# Patient Record
Sex: MALE | Race: BLACK OR AFRICAN AMERICAN | NOT HISPANIC OR LATINO | URBAN - METROPOLITAN AREA
[De-identification: names, ages, dates, MRNs, and addresses within clinical notes are randomized per-mention and may not be internally consistent; named-entity substitution may affect disease eponyms.]

---

## 2022-05-30 PROBLEM — D75.1 POLYCYTHEMIA: Status: ACTIVE | Noted: 2022-05-30

## 2022-05-30 NOTE — PROGRESS NOTES
Subjective:       Patient ID: Wei Carbone is a 81 y.o. male.    Chief Complaint: polycythemia      History of Present Illness  Chief complaint: polycythemia    HPI: 82 y/o M w/ PMHx of HLD, PAD, COPD, esophageal stricture referred to [facility] for polycythemia    Review of his labs reveals stable polycythemia dating back to 2015    His daughter who was present during initial interview, reported polycythemia in patient's mother, in her self and another sister (so 2 of his children) as well as maternal uncle.    Most recent phlebotomy 2/10/22 300ml.    Interval History:  Today 5/31/22: Patient here today for follow up visit. He feels well overall. Denies fevers, chills, CP, n/v/c/d, melena, hematochezia, hematuria, abd pain, fatigue, weight loss, weakness. He reports some DUARTE, which is unchanged from prior. No other complaints or concerns reported.    PMHx: HLD, PAD, COPD, esophageal stricture  PSHx: tonsillectomy, eye surgery, carpal tunnel, esophageal dilatation, atherectomy and PTA of right and left SFA, PTA of left common iliac artery  Social Hx: current every day smoker since 1961, 1 ppd  Family Hx: daughter: breast cancer, sister: unknown cancer, grandson: yolk sac, multiple family members including mother, 2 daughters and maternal uncle with polycythemia  Meds: reviewed  Allergies: atorvastatin, penicillin, pravastatin    Labs:  5/24/22 WBC 6.58, Hgb 17.6, Hct 53.7, , retic 0.88%  3/7/22 WBC 7.1 RBC 5.3 Hg 16.8 HCT 51   1/10/22 WBC 6.72, Hgb 18.4, Hct 55.8,   10/25/21 WBC 6.44 Hg 18.9 HCT 57  Phlebotomy of 500ml  6/28/21 WBC 5.90, hgb 17.5, hct 53.6, RBC 5.69, , MCV  4/27/21  Ferritin 33 WBC 7.07 RBC 5.44 Hg 16.9 HCT 51.2 MCV 94.1  ANC 4.13  1/27/21 Hgb 18.3, Hct 56.9, ferritin 38  11/10/20 Hg 17.1 HCT 52.2  10/6/20 Hg 17.3 HCT 53  9/10/20 Hgb 16.9, Hct 52.6,  remainder of CBC WNL  5/7/20 JAK2 V617F detected but below limit of quantitation (limit of  quantitation 0.5%), MPL neg, CALR neg  5/7/20 EPO receptor mutation analysis not detected in exon 7 or 8. VHL sequencing negative for mutation. EPO level 6. Testosterone 267, Cr 0.85  WBC 6.72 Hg 19.3 RBC 5.89 MCV 99 HCT 58.3   4/30/20 Cr 0.93 WBC 8.51 Hg 18.9 HCT 57.3 MCV 97.4 RDW 11.9  ANC 5.14 AEC 0.15 ABC 0.1 IG 0.03  3/5/20 Cr 0.9 Alb 3.9 TP 6.7 WBC 6.9 RBC 5.64 Hg 18.2 HCT 54.2 MCV 96.7 RDW 13   11/28/19 Hg 18.7 HCT 56.3  8/13/15 Hg 18.3 HCT 55.7    Imaging:  3/24/22 CT Chest: a few tiny (les than 5 mm) stable pulmonary nodules. There has been no adverse interval change since prior study.  10/2/20 CT chest w/o contrast: tiny b/l calcified and non calcified pulmonary nodules largest 2-3mm. Most likely benign, 12 month follow up recommended. Atherosclerosis and DJD. Ectasia of proximal descending thoracic aorta 3.4cm. Minimal b/l gynecomastia. Minimal bronchiectasis in RUL and RML  8/20/19 CXR: heart size normal, lungs are clear b/l. Pulm vasculature is normal    Path: none       Review of Systems  CONSTITUTIONAL: no fevers, no chills, no weight loss, no fatigue, no weakness  HEMATOLOGIC: no abnormal bleeding, no abnormal bruising, no drenching night sweats  ONCOLOGIC: no new masses or lumps  HEENT: no vision loss, no tinnitus or hearing loss, no nose bleeding, no dysphagia, no odynophagia  CVS: no chest pain, no palpitations, + dyspnea on exertion (unchanged)  RESP: no shortness of breath, no hemoptysis, no cough  GI: no nausea, no vomiting, no diarrhea, no constipation, no melena, no hematochezia, no hematemesis, no abdominal pain, no increase in abdominal girth  : no dysuria, no hematuria, no hesitancy, no scrotal swelling, no discharge  INTEGUMENT: no rashes, no abnormal bruising, no nail pitting, no hyperpigmentation  NEURO: no falls, no memory loss, no paresthesias or dysesthesias, no urofecal incontinence or retention, no loss of strength on any extremity  MSK: no back pain,  no new joint pain, no joint swelling  PSYCH: no suicidal or homicidal ideation, no depression, no insomnia, no anhedonia  ENDOCRINE: no heat or cold intolerance, no polyuria, no polydipsia         Objective:      Physical Exam  Vitals:    05/31/22 1002   BP: (!) 167/77   Pulse: 67   Resp: 18   Temp: 97.9 °F (36.6 °C)       ECOG PS 1  GA: AAOx3, NAD  HEENT: NCAT, PERRLA, EOMI, fair dentition, no oral ulcers  LYMPH: no cervical, axillary or supraclavicular adenopathy  CVS: s1s2 RRR, no M/R/G  RESP: CTA b/l but diminished, no crackles, no wheezes or rhonchi  ABD: soft, NT, ND, BS+, no hepatosplenomegaly  EXT: no deformities, no pedal edema  SKIN: no rashes, no bruises or purpura, warm and dry  NEURO: normal mentation, strength 5/5 on all 4 extremities, no sensory deficits     Assessment:       Problem List Items Addressed This Visit        Oncology    Polycythemia - Primary      Other Visit Diagnoses     Congenital polycythemia        Tobacco user                Plan:     1. Polycythemia D75.1   Etiology unclear but he is a smoker, has known COPD so chronic hypoxic state is likely. However some minimal increase in basophils noted which raises suspicion of MPN. JAK2 V617F detected but at very low levels, clinical significance uncertain  EPO level low normal. We discussed bone marrow biopsy, but wants to hold off now  Sleep study was discussed he previously but he still wishes to hold off. He may consider home study in the future. Will discuss again at his return visit.  For secondary polycythemia no strict Hg/HCT goals but ideally target Hg <18.5 g/dl. He felt a bit weak for 2 days after recent phlebotomy, volume reduced to 300ml instead of 500ml  Most recent phlebotomy on 2/10/22, 300 ml  For PV, target HCT<45. He is already on dual antiplatelet agents  Will keep blood work [id number] for now, next due 7/5/22  RTC in 12 weeks with CBC and retic  Call if any questions or concerns     2. 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 VHL sequencing of entire gene was negative. EPOR exon 7 and 8 negative  This does not exclude familial polycythemia. We may need to test BPGM, HIF2/EPAS1, methemoglobin or we may need full sequencing of EPOR and JAK2 genes for some cryptic mutations      3. Tobacco use Z72.0   Lung cancer screening. CT chest non contrast 10/2020 with 2-3mm b/l pulm nodules. Repeat obtained 3/2022 which showed stability.   He could not tolerate either Chantix, nicotine lozenge or nicotine patches. He is now not interested in quitting, he is smoking 1 ppd           SAIRA Hay

## 2022-05-31 ENCOUNTER — OFFICE VISIT (OUTPATIENT)
Dept: HEMATOLOGY/ONCOLOGY | Facility: CLINIC | Age: 82
End: 2022-05-31
Payer: COMMERCIAL

## 2022-05-31 VITALS
SYSTOLIC BLOOD PRESSURE: 167 MMHG | RESPIRATION RATE: 18 BRPM | HEART RATE: 67 BPM | DIASTOLIC BLOOD PRESSURE: 77 MMHG | WEIGHT: 163.81 LBS | TEMPERATURE: 98 F | BODY MASS INDEX: 26.33 KG/M2 | OXYGEN SATURATION: 96 % | HEIGHT: 66 IN

## 2022-05-31 DIAGNOSIS — Z72.0 TOBACCO USER: ICD-10-CM

## 2022-05-31 DIAGNOSIS — D75.1 POLYCYTHEMIA: Primary | ICD-10-CM

## 2022-05-31 DIAGNOSIS — D75.0 CONGENITAL POLYCYTHEMIA: ICD-10-CM

## 2022-05-31 PROCEDURE — 99213 OFFICE O/P EST LOW 20 MIN: CPT | Mod: ,,, | Performed by: NURSE PRACTITIONER

## 2022-05-31 PROCEDURE — 1126F PR PAIN SEVERITY QUANTIFIED, NO PAIN PRESENT: ICD-10-PCS | Mod: CPTII,,, | Performed by: NURSE PRACTITIONER

## 2022-05-31 PROCEDURE — 1101F PT FALLS ASSESS-DOCD LE1/YR: CPT | Mod: CPTII,,, | Performed by: NURSE PRACTITIONER

## 2022-05-31 PROCEDURE — 1101F PR PT FALLS ASSESS DOC 0-1 FALLS W/OUT INJ PAST YR: ICD-10-PCS | Mod: CPTII,,, | Performed by: NURSE PRACTITIONER

## 2022-05-31 PROCEDURE — 1160F PR REVIEW ALL MEDS BY PRESCRIBER/CLIN PHARMACIST DOCUMENTED: ICD-10-PCS | Mod: CPTII,,, | Performed by: NURSE PRACTITIONER

## 2022-05-31 PROCEDURE — 99213 PR OFFICE/OUTPT VISIT, EST, LEVL III, 20-29 MIN: ICD-10-PCS | Mod: ,,, | Performed by: NURSE PRACTITIONER

## 2022-05-31 PROCEDURE — 1160F RVW MEDS BY RX/DR IN RCRD: CPT | Mod: CPTII,,, | Performed by: NURSE PRACTITIONER

## 2022-05-31 PROCEDURE — 1159F MED LIST DOCD IN RCRD: CPT | Mod: CPTII,,, | Performed by: NURSE PRACTITIONER

## 2022-05-31 PROCEDURE — 1126F AMNT PAIN NOTED NONE PRSNT: CPT | Mod: CPTII,,, | Performed by: NURSE PRACTITIONER

## 2022-05-31 PROCEDURE — 3077F PR MOST RECENT SYSTOLIC BLOOD PRESSURE >= 140 MM HG: ICD-10-PCS | Mod: CPTII,,, | Performed by: NURSE PRACTITIONER

## 2022-05-31 PROCEDURE — 1159F PR MEDICATION LIST DOCUMENTED IN MEDICAL RECORD: ICD-10-PCS | Mod: CPTII,,, | Performed by: NURSE PRACTITIONER

## 2022-05-31 PROCEDURE — 3078F DIAST BP <80 MM HG: CPT | Mod: CPTII,,, | Performed by: NURSE PRACTITIONER

## 2022-05-31 PROCEDURE — 3288F FALL RISK ASSESSMENT DOCD: CPT | Mod: CPTII,,, | Performed by: NURSE PRACTITIONER

## 2022-05-31 PROCEDURE — 3288F PR FALLS RISK ASSESSMENT DOCUMENTED: ICD-10-PCS | Mod: CPTII,,, | Performed by: NURSE PRACTITIONER

## 2022-05-31 PROCEDURE — 3078F PR MOST RECENT DIASTOLIC BLOOD PRESSURE < 80 MM HG: ICD-10-PCS | Mod: CPTII,,, | Performed by: NURSE PRACTITIONER

## 2022-05-31 PROCEDURE — 3077F SYST BP >= 140 MM HG: CPT | Mod: CPTII,,, | Performed by: NURSE PRACTITIONER

## 2022-05-31 RX ORDER — FLUTICASONE PROPIONATE AND SALMETEROL 100; 50 UG/1; UG/1
1 POWDER RESPIRATORY (INHALATION) 2 TIMES DAILY
COMMUNITY

## 2022-05-31 RX ORDER — CLOPIDOGREL BISULFATE 75 MG/1
75 TABLET ORAL DAILY
COMMUNITY

## 2022-05-31 RX ORDER — ALBUTEROL SULFATE 0.83 MG/ML
2.5 SOLUTION RESPIRATORY (INHALATION) EVERY 6 HOURS PRN
COMMUNITY

## 2022-08-29 NOTE — PROGRESS NOTES
Subjective:       Patient ID: Wei Carbone is a 81 y.o. male.    Chief Complaint: Polycthemia (12 wk f/up with labs. No complaints)      History of Present Illness  Chief complaint: polycythemia    HPI: 82 y/o M w/ PMHx of HLD, PAD, COPD, esophageal stricture referred to [facility] for polycythemia    Review of his labs reveals stable polycythemia dating back to 2015    His daughter who was present during initial interview, reported polycythemia in patient's mother, in her self and another sister (so 2 of his children) as well as maternal uncle.    Most recent phlebotomy 2/10/22 300ml.    Interval History:  Today 8/30/22: Patient here today for follow up visit. He feels well overall. Denies fevers, chills, CP, n/v/c/d, melena, hematochezia, hematuria, abd pain, fatigue, weight loss, or weakness. He reports some DUARTE, which is unchanged from prior. No other complaints or concerns reported.    PMHx: HLD, PAD, COPD, esophageal stricture  PSHx: tonsillectomy, eye surgery, carpal tunnel, esophageal dilatation, atherectomy and PTA of right and left SFA, PTA of left common iliac artery  Social Hx: current every day smoker since 1961, 1 ppd  Family Hx: daughter: breast cancer, sister: unknown cancer, grandson: yolk sac, multiple family members including mother, 2 daughters and maternal uncle with polycythemia  Meds: reviewed  Allergies: atorvastatin, penicillin, pravastatin    Labs:  8/22/22 WBC 6.45, Hgb 17.6, Hct 53.6, , ANC 3.71, Retic 1.25%  5/24/22 WBC 6.58, Hgb 17.6, Hct 53.7, , retic 0.88%  3/7/22 WBC 7.1 RBC 5.3 Hg 16.8 HCT 51   1/10/22 WBC 6.72, Hgb 18.4, Hct 55.8,   10/25/21 WBC 6.44 Hg 18.9 HCT 57  Phlebotomy of 500ml  6/28/21 WBC 5.90, hgb 17.5, hct 53.6, RBC 5.69, , MCV  4/27/21  Ferritin 33 WBC 7.07 RBC 5.44 Hg 16.9 HCT 51.2 MCV 94.1  ANC 4.13  1/27/21 Hgb 18.3, Hct 56.9, ferritin 38  11/10/20 Hg 17.1 HCT 52.2  10/6/20 Hg 17.3 HCT 53  9/10/20 Hgb 16.9, Hct  52.6,  remainder of CBC WNL  5/7/20 JAK2 V617F detected but below limit of quantitation (limit of quantitation 0.5%), MPL neg, CALR neg  5/7/20 EPO receptor mutation analysis not detected in exon 7 or 8. VHL sequencing negative for mutation. EPO level 6. Testosterone 267, Cr 0.85  WBC 6.72 Hg 19.3 RBC 5.89 MCV 99 HCT 58.3   4/30/20 Cr 0.93 WBC 8.51 Hg 18.9 HCT 57.3 MCV 97.4 RDW 11.9  ANC 5.14 AEC 0.15 ABC 0.1 IG 0.03  3/5/20 Cr 0.9 Alb 3.9 TP 6.7 WBC 6.9 RBC 5.64 Hg 18.2 HCT 54.2 MCV 96.7 RDW 13   11/28/19 Hg 18.7 HCT 56.3  8/13/15 Hg 18.3 HCT 55.7    Imaging:  3/24/22 CT Chest: a few tiny (les than 5 mm) stable pulmonary nodules. There has been no adverse interval change since prior study.  10/2/20 CT chest w/o contrast: tiny b/l calcified and non calcified pulmonary nodules largest 2-3mm. Most likely benign, 12 month follow up recommended. Atherosclerosis and DJD. Ectasia of proximal descending thoracic aorta 3.4cm. Minimal b/l gynecomastia. Minimal bronchiectasis in RUL and RML  8/20/19 CXR: heart size normal, lungs are clear b/l. Pulm vasculature is normal    Path: none       Review of Systems  CONSTITUTIONAL: no fevers, no chills, no weight loss, no fatigue, no weakness  HEMATOLOGIC: no abnormal bleeding, no abnormal bruising, no drenching night sweats  ONCOLOGIC: no new masses or lumps  HEENT: no vision loss, no tinnitus or hearing loss, no nose bleeding, no dysphagia, no odynophagia  CVS: no chest pain, no palpitations, + dyspnea on exertion (unchanged)  RESP: no shortness of breath, no hemoptysis, no cough  GI: no nausea, no vomiting, no diarrhea, no constipation, no melena, no hematochezia, no hematemesis, no abdominal pain, no increase in abdominal girth  : no dysuria, no hematuria, no hesitancy, no scrotal swelling, no discharge  INTEGUMENT: no rashes, no abnormal bruising, no nail pitting, no hyperpigmentation  NEURO: no falls, no memory loss, no paresthesias or  dysesthesias, no urofecal incontinence or retention, no loss of strength on any extremity  MSK: no back pain, no new joint pain, no joint swelling  PSYCH: no suicidal or homicidal ideation, no depression, no insomnia, no anhedonia  ENDOCRINE: no heat or cold intolerance, no polyuria, no polydipsia         Objective:      Physical Exam  Vitals:    08/30/22 0938   BP: (!) 164/70   Pulse: 75   Resp: 18   Temp: 97.5 °F (36.4 °C)         ECOG PS 1  GA: AAOx3, NAD  HEENT: NCAT, PERRLA, EOMI, fair dentition, no oral ulcers  LYMPH: no cervical, axillary or supraclavicular adenopathy  CVS: s1s2 RRR, no M/R/G  RESP: CTA b/l but diminished, no crackles, no wheezes or rhonchi  ABD: soft, NT, ND, BS+, no hepatosplenomegaly  EXT: no deformities, no pedal edema  SKIN: no rashes, no bruises or purpura, warm and dry  NEURO: normal mentation, strength 5/5 on all 4 extremities, no sensory deficits     Assessment:       Problem List Items Addressed This Visit          Oncology    Polycythemia - Primary         Plan:     1. Polycythemia D75.1   Etiology unclear but he is a smoker, has known COPD so chronic hypoxic state is likely. However some minimal increase in basophils noted which raises suspicion of MPN. JAK2 V617F detected but at very low levels, clinical significance uncertain  EPO level low normal. We discussed bone marrow biopsy, but wants to hold off now  Sleep study was discussed he previously but he still wishes to hold off. He may consider home study in the future. Will discuss again at his return visit.  For secondary polycythemia no strict Hg/HCT goals but ideally target Hg <18.5 g/dl. He felt a bit weak for 2 days after recent phlebotomy, volume reduced to 300ml instead of 500ml  Most recent phlebotomy on 2/10/22, 300 ml  For PV, target HCT<45. He is already on dual antiplatelet agents  Will keep blood work [id number] for now, next due 10/11/22  RTC in 12 weeks with CBC and retic  Call if any questions or concerns     2.  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 VHL sequencing of entire gene was negative. EPOR exon 7 and 8 negative  This does not exclude familial polycythemia. We may need to test BPGM, HIF2/EPAS1, methemoglobin or we may need full sequencing of EPOR and JAK2 genes for some cryptic mutations      3. Tobacco use Z72.0   Lung cancer screening. CT chest non contrast 10/2020 with 2-3mm b/l pulm nodules. Repeat obtained 3/2022 which showed stability.   He could not tolerate either Chantix, nicotine lozenge or nicotine patches. He is now not interested in quitting, he is smoking 1 ppd but states he has been trying to cut back           SAIRA Hay

## 2022-08-30 ENCOUNTER — OFFICE VISIT (OUTPATIENT)
Dept: HEMATOLOGY/ONCOLOGY | Facility: CLINIC | Age: 82
End: 2022-08-30
Payer: COMMERCIAL

## 2022-08-30 VITALS
HEIGHT: 66 IN | HEART RATE: 75 BPM | TEMPERATURE: 98 F | OXYGEN SATURATION: 95 % | SYSTOLIC BLOOD PRESSURE: 164 MMHG | WEIGHT: 159.81 LBS | BODY MASS INDEX: 25.68 KG/M2 | RESPIRATION RATE: 18 BRPM | DIASTOLIC BLOOD PRESSURE: 70 MMHG

## 2022-08-30 DIAGNOSIS — D75.1 POLYCYTHEMIA: Primary | ICD-10-CM

## 2022-08-30 PROCEDURE — 1159F PR MEDICATION LIST DOCUMENTED IN MEDICAL RECORD: ICD-10-PCS | Mod: CPTII,,, | Performed by: NURSE PRACTITIONER

## 2022-08-30 PROCEDURE — 3078F PR MOST RECENT DIASTOLIC BLOOD PRESSURE < 80 MM HG: ICD-10-PCS | Mod: CPTII,,, | Performed by: NURSE PRACTITIONER

## 2022-08-30 PROCEDURE — 3288F FALL RISK ASSESSMENT DOCD: CPT | Mod: CPTII,,, | Performed by: NURSE PRACTITIONER

## 2022-08-30 PROCEDURE — 1101F PT FALLS ASSESS-DOCD LE1/YR: CPT | Mod: CPTII,,, | Performed by: NURSE PRACTITIONER

## 2022-08-30 PROCEDURE — 3077F PR MOST RECENT SYSTOLIC BLOOD PRESSURE >= 140 MM HG: ICD-10-PCS | Mod: CPTII,,, | Performed by: NURSE PRACTITIONER

## 2022-08-30 PROCEDURE — 3077F SYST BP >= 140 MM HG: CPT | Mod: CPTII,,, | Performed by: NURSE PRACTITIONER

## 2022-08-30 PROCEDURE — 99213 PR OFFICE/OUTPT VISIT, EST, LEVL III, 20-29 MIN: ICD-10-PCS | Mod: ,,, | Performed by: NURSE PRACTITIONER

## 2022-08-30 PROCEDURE — 1126F AMNT PAIN NOTED NONE PRSNT: CPT | Mod: CPTII,,, | Performed by: NURSE PRACTITIONER

## 2022-08-30 PROCEDURE — 1126F PR PAIN SEVERITY QUANTIFIED, NO PAIN PRESENT: ICD-10-PCS | Mod: CPTII,,, | Performed by: NURSE PRACTITIONER

## 2022-08-30 PROCEDURE — 1101F PR PT FALLS ASSESS DOC 0-1 FALLS W/OUT INJ PAST YR: ICD-10-PCS | Mod: CPTII,,, | Performed by: NURSE PRACTITIONER

## 2022-08-30 PROCEDURE — 3078F DIAST BP <80 MM HG: CPT | Mod: CPTII,,, | Performed by: NURSE PRACTITIONER

## 2022-08-30 PROCEDURE — 3288F PR FALLS RISK ASSESSMENT DOCUMENTED: ICD-10-PCS | Mod: CPTII,,, | Performed by: NURSE PRACTITIONER

## 2022-08-30 PROCEDURE — 1159F MED LIST DOCD IN RCRD: CPT | Mod: CPTII,,, | Performed by: NURSE PRACTITIONER

## 2022-08-30 PROCEDURE — 99213 OFFICE O/P EST LOW 20 MIN: CPT | Mod: ,,, | Performed by: NURSE PRACTITIONER

## 2022-11-18 ENCOUNTER — TELEPHONE (OUTPATIENT)
Dept: HEMATOLOGY/ONCOLOGY | Facility: CLINIC | Age: 82
End: 2022-11-18
Payer: COMMERCIAL

## 2022-11-18 NOTE — TELEPHONE ENCOUNTER
Informed by the labs of having abdominal hemoglobin of 18.5 and hematocrit of 54.8.      Called the patient on available phone numbers but phone not answered.     Patient is scheduled for appointment next week.      Jasmina Pittman MD  Hematology / Oncology

## 2022-11-22 ENCOUNTER — OFFICE VISIT (OUTPATIENT)
Dept: HEMATOLOGY/ONCOLOGY | Facility: CLINIC | Age: 82
End: 2022-11-22
Payer: MEDICARE

## 2022-11-22 ENCOUNTER — TELEPHONE (OUTPATIENT)
Dept: HEMATOLOGY/ONCOLOGY | Facility: CLINIC | Age: 82
End: 2022-11-22

## 2022-11-22 VITALS
TEMPERATURE: 99 F | HEIGHT: 66 IN | WEIGHT: 160.63 LBS | SYSTOLIC BLOOD PRESSURE: 154 MMHG | HEART RATE: 68 BPM | RESPIRATION RATE: 18 BRPM | DIASTOLIC BLOOD PRESSURE: 78 MMHG | BODY MASS INDEX: 25.81 KG/M2 | OXYGEN SATURATION: 95 %

## 2022-11-22 DIAGNOSIS — Z72.0 TOBACCO USER: ICD-10-CM

## 2022-11-22 DIAGNOSIS — D75.1 POLYCYTHEMIA: Primary | ICD-10-CM

## 2022-11-22 DIAGNOSIS — D75.0 CONGENITAL POLYCYTHEMIA: ICD-10-CM

## 2022-11-22 PROCEDURE — 1160F PR REVIEW ALL MEDS BY PRESCRIBER/CLIN PHARMACIST DOCUMENTED: ICD-10-PCS | Mod: CPTII,,, | Performed by: NURSE PRACTITIONER

## 2022-11-22 PROCEDURE — 1126F AMNT PAIN NOTED NONE PRSNT: CPT | Mod: CPTII,,, | Performed by: NURSE PRACTITIONER

## 2022-11-22 PROCEDURE — 3077F PR MOST RECENT SYSTOLIC BLOOD PRESSURE >= 140 MM HG: ICD-10-PCS | Mod: CPTII,,, | Performed by: NURSE PRACTITIONER

## 2022-11-22 PROCEDURE — 99214 PR OFFICE/OUTPT VISIT, EST, LEVL IV, 30-39 MIN: ICD-10-PCS | Mod: ,,, | Performed by: NURSE PRACTITIONER

## 2022-11-22 PROCEDURE — 3077F SYST BP >= 140 MM HG: CPT | Mod: CPTII,,, | Performed by: NURSE PRACTITIONER

## 2022-11-22 PROCEDURE — 99214 OFFICE O/P EST MOD 30 MIN: CPT | Mod: ,,, | Performed by: NURSE PRACTITIONER

## 2022-11-22 PROCEDURE — 3288F FALL RISK ASSESSMENT DOCD: CPT | Mod: CPTII,,, | Performed by: NURSE PRACTITIONER

## 2022-11-22 PROCEDURE — 1101F PT FALLS ASSESS-DOCD LE1/YR: CPT | Mod: CPTII,,, | Performed by: NURSE PRACTITIONER

## 2022-11-22 PROCEDURE — 1160F RVW MEDS BY RX/DR IN RCRD: CPT | Mod: CPTII,,, | Performed by: NURSE PRACTITIONER

## 2022-11-22 PROCEDURE — 1101F PR PT FALLS ASSESS DOC 0-1 FALLS W/OUT INJ PAST YR: ICD-10-PCS | Mod: CPTII,,, | Performed by: NURSE PRACTITIONER

## 2022-11-22 PROCEDURE — 3078F PR MOST RECENT DIASTOLIC BLOOD PRESSURE < 80 MM HG: ICD-10-PCS | Mod: CPTII,,, | Performed by: NURSE PRACTITIONER

## 2022-11-22 PROCEDURE — 3288F PR FALLS RISK ASSESSMENT DOCUMENTED: ICD-10-PCS | Mod: CPTII,,, | Performed by: NURSE PRACTITIONER

## 2022-11-22 PROCEDURE — 1126F PR PAIN SEVERITY QUANTIFIED, NO PAIN PRESENT: ICD-10-PCS | Mod: CPTII,,, | Performed by: NURSE PRACTITIONER

## 2022-11-22 PROCEDURE — 3078F DIAST BP <80 MM HG: CPT | Mod: CPTII,,, | Performed by: NURSE PRACTITIONER

## 2022-11-22 PROCEDURE — 1159F PR MEDICATION LIST DOCUMENTED IN MEDICAL RECORD: ICD-10-PCS | Mod: CPTII,,, | Performed by: NURSE PRACTITIONER

## 2022-11-22 PROCEDURE — 1159F MED LIST DOCD IN RCRD: CPT | Mod: CPTII,,, | Performed by: NURSE PRACTITIONER

## 2022-11-22 NOTE — PROGRESS NOTES
Subjective:       Patient ID: Wei Carbone is a 82 y.o. male.    Chief Complaint: No chief complaint on file.      History of Present Illness  Chief complaint: polycythemia    HPI: 80 y/o M w/ PMHx of HLD, PAD, COPD, esophageal stricture referred to [facility] for polycythemia    Review of his labs reveals stable polycythemia dating back to 2015    His daughter who was present during initial interview, reported polycythemia in patient's mother, in her self and another sister (so 2 of his children) as well as maternal uncle.    phlebotomy history: 2/10/22 300ml.    Interval History:  11/22/2022:   Patient presents to clinic today for a 3 month follow up visit.   Feeling sad. States he lost a grand daughter yesterday(MI secconary to illicit drug use). Otherwise states has been doing well, and has not had a phlebotomy in 6 months.   Had some abdominal bloating last week,none since. Has occurred x 2 in last 6 months. No abdominal pain. Takes a over the counter medication(Acdil). Treats symptoms well.   He denies any satiety. He denies any itching, numbness, tingling, burning, or weakness of hands, feet, arms or legs.   No unusual bleeding, painful swelling of joints. No shortness of breath or difficulty breathing when lying down.   No fevers, chills, CP, n/v/c/d, fatigue, weight loss, or weakness.  He reports some SOB with exertion. States has had for many years, which is unchanged from prior. Attributes to having Emphysema.  Continues to smoke and states does not want to quit.     PMHx: HLD, PAD, COPD, esophageal stricture  PSHx: tonsillectomy, eye surgery, carpal tunnel, esophageal dilatation, atherectomy and PTA of right and left SFA, PTA of left common iliac artery  Social Hx: current every day smoker since 1961, 1 ppd  Family Hx: daughter: breast cancer, sister: unknown cancer, grandson: yolk sac, multiple family members including mother, 2 daughters and maternal uncle with polycythemia  Meds: reviewed  Allergies:  atorvastatin, penicillin, pravastatin    Labs:  11/17/2022:  WBC 7.19, hemoglobin 18.0, hematocrit 35.9, MCV 98.1, , retic 1.18, ANC 4.76  8/22/22 WBC 6.45, Hgb 17.6, Hct 53.6, , ANC 3.71, Retic 1.25%  5/24/22 WBC 6.58, Hgb 17.6, Hct 53.7, , retic 0.88%  3/7/22 WBC 7.1 RBC 5.3 Hg 16.8 HCT 51   1/10/22 WBC 6.72, Hgb 18.4, Hct 55.8,   10/25/21 WBC 6.44 Hg 18.9 HCT 57  Phlebotomy of 500ml  6/28/21 WBC 5.90, hgb 17.5, hct 53.6, RBC 5.69, , MCV  4/27/21  Ferritin 33 WBC 7.07 RBC 5.44 Hg 16.9 HCT 51.2 MCV 94.1  ANC 4.13  1/27/21 Hgb 18.3, Hct 56.9, ferritin 38  11/10/20 Hg 17.1 HCT 52.2  10/6/20 Hg 17.3 HCT 53  9/10/20 Hgb 16.9, Hct 52.6,  remainder of CBC WNL  5/7/20 JAK2 V617F detected but below limit of quantitation (limit of quantitation 0.5%), MPL neg, CALR neg  5/7/20 EPO receptor mutation analysis not detected in exon 7 or 8. VHL sequencing negative for mutation. EPO level 6. Testosterone 267, Cr 0.85  WBC 6.72 Hg 19.3 RBC 5.89 MCV 99 HCT 58.3   4/30/20 Cr 0.93 WBC 8.51 Hg 18.9 HCT 57.3 MCV 97.4 RDW 11.9  ANC 5.14 AEC 0.15 ABC 0.1 IG 0.03  3/5/20 Cr 0.9 Alb 3.9 TP 6.7 WBC 6.9 RBC 5.64 Hg 18.2 HCT 54.2 MCV 96.7 RDW 13   11/28/19 Hg 18.7 HCT 56.3  8/13/15 Hg 18.3 HCT 55.7    Imaging:  3/24/22 CT Chest: a few tiny (les than 5 mm) stable pulmonary nodules. There has been no adverse interval change since prior study.  10/2/20 CT chest w/o contrast: tiny b/l calcified and non calcified pulmonary nodules largest 2-3mm. Most likely benign, 12 month follow up recommended. Atherosclerosis and DJD. Ectasia of proximal descending thoracic aorta 3.4cm. Minimal b/l gynecomastia. Minimal bronchiectasis in RUL and RML  8/20/19 CXR: heart size normal, lungs are clear b/l. Pulm vasculature is normal    Path: none     Review of Systems  CONSTITUTIONAL: no fevers, no chills, no weight loss, no fatigue, no weakness  HEMATOLOGIC: no  "abnormal bleeding, no abnormal bruising, no drenching night sweats  ONCOLOGIC: no new masses or lumps  HEENT: no vision loss, no tinnitus or hearing loss, no nose bleeding, no dysphagia, no odynophagia  CVS: no chest pain, no palpitations, + dyspnea on exertion (unchanged)  RESP: no shortness of breath, no hemoptysis, no cough  GI: no nausea, no vomiting, no diarrhea, no constipation, no melena, no hematochezia, no hematemesis, no abdominal pain, no increase in abdominal girth  : no dysuria, no hematuria, no hesitancy, no scrotal swelling, no discharge  INTEGUMENT: no rashes, no abnormal bruising, no nail pitting, no hyperpigmentation  NEURO: no falls, no memory loss, no paresthesias or dysesthesias, no urofecal incontinence or retention, no loss of strength on any extremity  MSK: no back pain, no new joint pain, no joint swelling  PSYCH: no suicidal or homicidal ideation, no depression, no insomnia, no anhedonia  ENDOCRINE: no heat or cold intolerance, no polyuria, no polydipsia     Objective:     Physical Exam  Blood pressure (!) 154/78, pulse 68, temperature 98.7 °F (37.1 °C), temperature source Oral, resp. rate 18, height 5' 6" (1.676 m), weight 72.8 kg (160 lb 9.6 oz), SpO2 95 %.  ECOG PS 1  GA: AAOx3, NAD  HEENT: NCAT, PERRLA, EOMI, fair dentition, no oral ulcers  LYMPH: no cervical, axillary or supraclavicular adenopathy  CVS: s1s2 RRR, no M/R/G  RESP: CTA b/l but diminished bilaterally, no crackles, no wheezes or rhonchi. No labored breathing.   ABD: soft, NT, ND, BS+, no hepatosplenomegaly  EXT: no deformities, no pedal edema  SKIN: no rashes, no bruises or purpura, warm and dry  NEURO: normal mentation, strength 5/5 on all 4 extremities, no sensory deficits     Assessment/Plan:     1. Polycythemia D75.1   Etiology unclear but he is a smoker, has known COPD so chronic hypoxic state is likely. However some minimal increase in basophils noted which raises suspicion of MPN. JAK2 V617F detected but at very " low levels, clinical significance uncertain  EPO level low normal. We discussed bone marrow biopsy, but wants to hold off now  Sleep study was discussed he previously but he still wishes to hold off. He may consider home study in the future. Will discuss again at his return visit.  For secondary polycythemia no strict Hg/HCT goals but ideally target Hg <18.5 g/dl. He felt a bit weak for 2 days after recent phlebotomy, volume reduced to 300ml instead of 500ml  Most recent phlebotomy on 2/10/22, 300 ml  For PV, target HCT<45.   He is now only on Plavix.  HCT 55.9 today. Will set him up for phlebotomy today. 300 mLs.   Will keep blood work q 6 weeks for now, CBC  due 1/3/2023.   RTC in 12 weeks with CBC and retic  Call if any questions or concerns     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 VHL sequencing of entire gene was negative. EPOR exon 7 and 8 negative  This does not exclude familial polycythemia. We may need to test BPGM, HIF2/EPAS1, methemoglobin or we may need full sequencing of EPOR and JAK2 genes for some cryptic mutations      3. Tobacco use Z72.0   Lung cancer screening. CT chest non contrast 10/2020 with 2-3mm b/l pulm nodules. Repeat obtained 3/2022 which showed stability.   He could not tolerate either Chantix, nicotine lozenge or nicotine patches. He is now not interested in quitting at presemt/       WILLIAM Akers

## 2023-02-07 ENCOUNTER — TELEPHONE (OUTPATIENT)
Dept: HEMATOLOGY/ONCOLOGY | Facility: CLINIC | Age: 83
End: 2023-02-07
Payer: MEDICARE

## 2023-02-14 ENCOUNTER — OFFICE VISIT (OUTPATIENT)
Dept: HEMATOLOGY/ONCOLOGY | Facility: CLINIC | Age: 83
End: 2023-02-14
Payer: MEDICARE

## 2023-02-14 VITALS
WEIGHT: 151 LBS | SYSTOLIC BLOOD PRESSURE: 173 MMHG | BODY MASS INDEX: 24.27 KG/M2 | TEMPERATURE: 98 F | OXYGEN SATURATION: 95 % | DIASTOLIC BLOOD PRESSURE: 81 MMHG | HEIGHT: 66 IN | RESPIRATION RATE: 18 BRPM | HEART RATE: 78 BPM

## 2023-02-14 DIAGNOSIS — D75.1 POLYCYTHEMIA: Primary | ICD-10-CM

## 2023-02-14 DIAGNOSIS — F17.200 SMOKING: ICD-10-CM

## 2023-02-14 PROCEDURE — 1101F PT FALLS ASSESS-DOCD LE1/YR: CPT | Mod: CPTII,,, | Performed by: STUDENT IN AN ORGANIZED HEALTH CARE EDUCATION/TRAINING PROGRAM

## 2023-02-14 PROCEDURE — 3077F PR MOST RECENT SYSTOLIC BLOOD PRESSURE >= 140 MM HG: ICD-10-PCS | Mod: CPTII,,, | Performed by: STUDENT IN AN ORGANIZED HEALTH CARE EDUCATION/TRAINING PROGRAM

## 2023-02-14 PROCEDURE — 1126F AMNT PAIN NOTED NONE PRSNT: CPT | Mod: CPTII,,, | Performed by: STUDENT IN AN ORGANIZED HEALTH CARE EDUCATION/TRAINING PROGRAM

## 2023-02-14 PROCEDURE — 99214 PR OFFICE/OUTPT VISIT, EST, LEVL IV, 30-39 MIN: ICD-10-PCS | Mod: ,,, | Performed by: STUDENT IN AN ORGANIZED HEALTH CARE EDUCATION/TRAINING PROGRAM

## 2023-02-14 PROCEDURE — 1126F PR PAIN SEVERITY QUANTIFIED, NO PAIN PRESENT: ICD-10-PCS | Mod: CPTII,,, | Performed by: STUDENT IN AN ORGANIZED HEALTH CARE EDUCATION/TRAINING PROGRAM

## 2023-02-14 PROCEDURE — 1159F MED LIST DOCD IN RCRD: CPT | Mod: CPTII,,, | Performed by: STUDENT IN AN ORGANIZED HEALTH CARE EDUCATION/TRAINING PROGRAM

## 2023-02-14 PROCEDURE — 3077F SYST BP >= 140 MM HG: CPT | Mod: CPTII,,, | Performed by: STUDENT IN AN ORGANIZED HEALTH CARE EDUCATION/TRAINING PROGRAM

## 2023-02-14 PROCEDURE — 3288F PR FALLS RISK ASSESSMENT DOCUMENTED: ICD-10-PCS | Mod: CPTII,,, | Performed by: STUDENT IN AN ORGANIZED HEALTH CARE EDUCATION/TRAINING PROGRAM

## 2023-02-14 PROCEDURE — 1159F PR MEDICATION LIST DOCUMENTED IN MEDICAL RECORD: ICD-10-PCS | Mod: CPTII,,, | Performed by: STUDENT IN AN ORGANIZED HEALTH CARE EDUCATION/TRAINING PROGRAM

## 2023-02-14 PROCEDURE — 3079F PR MOST RECENT DIASTOLIC BLOOD PRESSURE 80-89 MM HG: ICD-10-PCS | Mod: CPTII,,, | Performed by: STUDENT IN AN ORGANIZED HEALTH CARE EDUCATION/TRAINING PROGRAM

## 2023-02-14 PROCEDURE — 99214 OFFICE O/P EST MOD 30 MIN: CPT | Mod: ,,, | Performed by: STUDENT IN AN ORGANIZED HEALTH CARE EDUCATION/TRAINING PROGRAM

## 2023-02-14 PROCEDURE — 3288F FALL RISK ASSESSMENT DOCD: CPT | Mod: CPTII,,, | Performed by: STUDENT IN AN ORGANIZED HEALTH CARE EDUCATION/TRAINING PROGRAM

## 2023-02-14 PROCEDURE — 1101F PR PT FALLS ASSESS DOC 0-1 FALLS W/OUT INJ PAST YR: ICD-10-PCS | Mod: CPTII,,, | Performed by: STUDENT IN AN ORGANIZED HEALTH CARE EDUCATION/TRAINING PROGRAM

## 2023-02-14 PROCEDURE — 3079F DIAST BP 80-89 MM HG: CPT | Mod: CPTII,,, | Performed by: STUDENT IN AN ORGANIZED HEALTH CARE EDUCATION/TRAINING PROGRAM

## 2023-02-14 RX ORDER — TRAZODONE HYDROCHLORIDE 50 MG/1
TABLET ORAL
COMMUNITY
Start: 2022-10-31

## 2023-02-14 RX ORDER — ALPRAZOLAM 0.5 MG/1
TABLET ORAL
COMMUNITY
Start: 2023-02-10

## 2023-02-14 NOTE — PROGRESS NOTES
Subjective:       Patient ID: Wei Carbone is a 82 y.o. male.    Chief Complaint: no complaints      Previous hematologist/oncologist:  Dr. Burden      History of Present Illness  Chief complaint: polycythemia    HPI: 82 y/o M w/ PMHx of HLD, PAD, COPD, esophageal stricture referred to [facility] for polycythemia    Review of his labs reveals stable polycythemia dating back to 2015    His daughter who was present during initial interview, reported polycythemia in patient's mother, in her self and another sister (so 2 of his children) as well as maternal uncle.      Interval History:  02/14/2023, patient denies any acute concerns since last follow-up visit with us in November.  He denies any further phlebotomy since November 2022.  Denies any headaches, vision changes, focal weakness, tingling/burning of his extremities or itching after hot shower.  He continues to smoke.  He denies any new medications ER or hospital visits.    PMHx: HLD, PAD, COPD, esophageal stricture  PSHx: tonsillectomy, eye surgery, carpal tunnel, esophageal dilatation, atherectomy and PTA of right and left SFA, PTA of left common iliac artery  Social Hx: current every day smoker since 1961, 1 ppd  Family Hx: daughter: breast cancer, sister: unknown cancer, grandson: yolk sac, multiple family members including mother, 2 daughters and maternal uncle with polycythemia  Meds: reviewed  Allergies: atorvastatin, penicillin, pravastatin    Labs:  02/07/2023:  WBC 6.2, hemoglobin 17.8, hematocrit 55.4, platelet count 264, ANC 3.26,  11/17/2022:  WBC 7.19, hemoglobin 18.0, hematocrit 35.9, MCV 98.1, , retic 1.18, ANC 4.76  8/22/22 WBC 6.45, Hgb 17.6, Hct 53.6, , ANC 3.71, Retic 1.25%  5/24/22 WBC 6.58, Hgb 17.6, Hct 53.7, , retic 0.88%  3/7/22 WBC 7.1 RBC 5.3 Hg 16.8 HCT 51   1/10/22 WBC 6.72, Hgb 18.4, Hct 55.8,   10/25/21 WBC 6.44 Hg 18.9 HCT 57  Phlebotomy of 500ml  6/28/21 WBC 5.90, hgb 17.5, hct 53.6, RBC  5.69, , MCV  4/27/21  Ferritin 33 WBC 7.07 RBC 5.44 Hg 16.9 HCT 51.2 MCV 94.1  ANC 4.13  1/27/21 Hgb 18.3, Hct 56.9, ferritin 38  11/10/20 Hg 17.1 HCT 52.2  10/6/20 Hg 17.3 HCT 53  9/10/20 Hgb 16.9, Hct 52.6,  remainder of CBC WNL  5/7/20 JAK2 V617F detected but below limit of quantitation (limit of quantitation 0.5%), MPL neg, CALR neg  5/7/20 EPO receptor mutation analysis not detected in exon 7 or 8. VHL sequencing negative for mutation. EPO level 6. Testosterone 267, Cr 0.85  WBC 6.72 Hg 19.3 RBC 5.89 MCV 99 HCT 58.3   4/30/20 Cr 0.93 WBC 8.51 Hg 18.9 HCT 57.3 MCV 97.4 RDW 11.9  ANC 5.14 AEC 0.15 ABC 0.1 IG 0.03  3/5/20 Cr 0.9 Alb 3.9 TP 6.7 WBC 6.9 RBC 5.64 Hg 18.2 HCT 54.2 MCV 96.7 RDW 13   11/28/19 Hg 18.7 HCT 56.3  8/13/15 Hg 18.3 HCT 55.7    Imaging:  3/24/22 CT Chest: a few tiny (les than 5 mm) stable pulmonary nodules. There has been no adverse interval change since prior study.  10/2/20 CT chest w/o contrast: tiny b/l calcified and non calcified pulmonary nodules largest 2-3mm. Most likely benign, 12 month follow up recommended. Atherosclerosis and DJD. Ectasia of proximal descending thoracic aorta 3.4cm. Minimal b/l gynecomastia. Minimal bronchiectasis in RUL and RML  8/20/19 CXR: heart size normal, lungs are clear b/l. Pulm vasculature is normal    Path: none     Review of Systems  CONSTITUTIONAL: no fevers, no chills, no weight loss, no fatigue, no weakness  HEMATOLOGIC: no abnormal bleeding, no abnormal bruising, no drenching night sweats  ONCOLOGIC: no new masses or lumps  HEENT: no vision loss, no tinnitus or hearing loss, no nose bleeding, no dysphagia, no odynophagia  CVS: no chest pain, no palpitations, + dyspnea on exertion (unchanged)  RESP: no shortness of breath, no hemoptysis, no cough  GI: no nausea, no vomiting, no diarrhea, no constipation, no melena, no hematochezia, no hematemesis, no abdominal pain, no increase in abdominal  "girth  : no dysuria, no hematuria, no hesitancy, no scrotal swelling, no discharge  INTEGUMENT: no rashes, no abnormal bruising, no nail pitting, no hyperpigmentation  NEURO: no falls, no memory loss, no paresthesias or dysesthesias, no urofecal incontinence or retention, no loss of strength on any extremity  MSK: no back pain, no new joint pain, no joint swelling  PSYCH: no suicidal or homicidal ideation, no depression, no insomnia, no anhedonia  ENDOCRINE: no heat or cold intolerance, no polyuria, no polydipsia     Objective:     Physical Exam  Blood pressure (!) 173/81, pulse 78, temperature 97.6 °F (36.4 °C), temperature source Oral, resp. rate 18, height 5' 6" (1.676 m), weight 68.5 kg (151 lb), SpO2 95 %.  ECOG PS 1  GA: AAOx3, NAD  HEENT: NCAT, PERRLA, EOMI, fair dentition, no oral ulcers  LYMPH: no cervical, axillary or supraclavicular adenopathy  CVS: s1s2 RRR, no M/R/G  RESP: CTA b/l but diminished bilaterally, no crackles, no wheezes or rhonchi. No labored breathing.   ABD: soft, NT, ND, BS+, no hepatosplenomegaly  EXT: no deformities, no pedal edema  SKIN: no rashes, no bruises or purpura, warm and dry  NEURO: normal mentation, strength 5/5 on all 4 extremities, no sensory deficits     Assessment/Plan:     1. Polycythemia D75.1   Etiology unclear but he is a smoker, has known COPD so chronic hypoxic state is likely. However some minimal increase in basophils noted which raises suspicion of MPN. JAK2 V617F detected but at very low levels, clinical significance uncertain  EPO level low normal.  Bone marrow biopsy was discussed in the past but he wanted to hold off at that point.  Sleep study was discussed he previously but he still wishes to hold off.  For secondary polycythemia no strict Hg/HCT goals but ideally target Hg <18.0 g/dl.  He is now only on Plavix.      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 " VHL sequencing of entire gene was negative. EPOR exon 7 and 8 negative  This does not exclude familial polycythemia. We may need to test BPGM, HIF2/EPAS1, methemoglobin or we may need full sequencing of EPOR and JAK2 genes for some cryptic mutations      3. Tobacco use Z72.0   Lung cancer screening. CT chest non contrast 10/2020 with 2-3mm b/l pulm nodules. Repeat obtained 3/2022 which showed stability.   He could not tolerate either Chantix, nicotine lozenge or nicotine patches. He is now not interested in quitting at presemt/         Plan  Patient's polycythemia is likely secondary to smoking.    Reviewed labs, noted hemoglobin less than 18 grams/deciliter.  Will continue CBC q.6 weeks with plans for phlebotomy if patient's hemoglobin is more than 18 grams/deciliter.    Discussed bone marrow biopsy to rule out myeloproliferative neoplasms however patient continues to decline it   Patient continues to be on Plavix for primary prevention under PCP's care.    Plan to follow-up in 3 months with CBC day prior.    A total of  30 minutes were spent in review of records, interpretation of test, coordination of care, discussion and counseling with the patient.        Portions of the record may have been created with voice recognition software. Occasional wrong-word or sound-a-like substitutions may have occurred due to the inherent limitations of voice recognition software. Read the chart carefully and recognize, using context, where substitutions have occurred.

## 2023-03-28 ENCOUNTER — TELEPHONE (OUTPATIENT)
Dept: HEMATOLOGY/ONCOLOGY | Facility: CLINIC | Age: 83
End: 2023-03-28

## 2023-03-28 NOTE — TELEPHONE ENCOUNTER
Pt's daughter Buffy notified of new orders and instructed to report to [facility] registration. Verbalizes understanding.--SC, LPN

## 2023-03-28 NOTE — TELEPHONE ENCOUNTER
Gerald with [facility] [place] ciritical Hgb 18.2 HCT 57.5. Dr. Osorio notified.--SC, LPN

## 2023-05-30 ENCOUNTER — OFFICE VISIT (OUTPATIENT)
Dept: HEMATOLOGY/ONCOLOGY | Facility: CLINIC | Age: 83
End: 2023-05-30
Payer: MEDICARE

## 2023-05-30 VITALS
SYSTOLIC BLOOD PRESSURE: 172 MMHG | BODY MASS INDEX: 24.94 KG/M2 | TEMPERATURE: 98 F | HEART RATE: 71 BPM | HEIGHT: 66 IN | OXYGEN SATURATION: 95 % | DIASTOLIC BLOOD PRESSURE: 78 MMHG | WEIGHT: 155.19 LBS | RESPIRATION RATE: 18 BRPM

## 2023-05-30 DIAGNOSIS — D75.1 POLYCYTHEMIA: Primary | ICD-10-CM

## 2023-05-30 DIAGNOSIS — Z72.0 TOBACCO USER: ICD-10-CM

## 2023-05-30 PROCEDURE — 1101F PR PT FALLS ASSESS DOC 0-1 FALLS W/OUT INJ PAST YR: ICD-10-PCS | Mod: CPTII,,,

## 2023-05-30 PROCEDURE — 1126F PR PAIN SEVERITY QUANTIFIED, NO PAIN PRESENT: ICD-10-PCS | Mod: CPTII,,,

## 2023-05-30 PROCEDURE — 3077F SYST BP >= 140 MM HG: CPT | Mod: CPTII,,,

## 2023-05-30 PROCEDURE — 3288F FALL RISK ASSESSMENT DOCD: CPT | Mod: CPTII,,,

## 2023-05-30 PROCEDURE — 1126F AMNT PAIN NOTED NONE PRSNT: CPT | Mod: CPTII,,,

## 2023-05-30 PROCEDURE — 3288F PR FALLS RISK ASSESSMENT DOCUMENTED: ICD-10-PCS | Mod: CPTII,,,

## 2023-05-30 PROCEDURE — 99214 PR OFFICE/OUTPT VISIT, EST, LEVL IV, 30-39 MIN: ICD-10-PCS | Mod: ,,,

## 2023-05-30 PROCEDURE — 1159F MED LIST DOCD IN RCRD: CPT | Mod: CPTII,,,

## 2023-05-30 PROCEDURE — 3077F PR MOST RECENT SYSTOLIC BLOOD PRESSURE >= 140 MM HG: ICD-10-PCS | Mod: CPTII,,,

## 2023-05-30 PROCEDURE — 1101F PT FALLS ASSESS-DOCD LE1/YR: CPT | Mod: CPTII,,,

## 2023-05-30 PROCEDURE — 3078F PR MOST RECENT DIASTOLIC BLOOD PRESSURE < 80 MM HG: ICD-10-PCS | Mod: CPTII,,,

## 2023-05-30 PROCEDURE — 3078F DIAST BP <80 MM HG: CPT | Mod: CPTII,,,

## 2023-05-30 PROCEDURE — 1159F PR MEDICATION LIST DOCUMENTED IN MEDICAL RECORD: ICD-10-PCS | Mod: CPTII,,,

## 2023-05-30 PROCEDURE — 99214 OFFICE O/P EST MOD 30 MIN: CPT | Mod: ,,,

## 2023-05-30 NOTE — PROGRESS NOTES
"Subjective:       Patient ID: Wei Carbone is a 82 y.o. male.    Chief Complaint: Follow-up      Previous hematologist/oncologist:  Dr. Burden      History of Present Illness  Chief complaint: polycythemia    HPI: 82 y/o M w/ PMHx of HLD, PAD, COPD, esophageal stricture referred to [facility] for polycythemia    Review of his labs reveals stable polycythemia dating back to 2015    His daughter who was present during initial interview, reported polycythemia in patient's mother, in her self and another sister (so 2 of his children) as well as maternal uncle.      Interval History:  05/30/2023, patient denies any acute concerns since last follow-up visit.  He denies any further phlebotomy since 3/2023. He states not feeling well after last phlebotomy. He states "they took too much blood and I feel weak for days".  Denies any headaches, vision changes, focal weakness, tingling/burning of his extremities or itching after hot shower.  He continues to smoke.  He denies any new medications ER or hospital visits.    PMHx: HLD, PAD, COPD, esophageal stricture  PSHx: tonsillectomy, eye surgery, carpal tunnel, esophageal dilatation, atherectomy and PTA of right and left SFA, PTA of left common iliac artery  Social Hx: current every day smoker since 1961, 1 ppd  Family Hx: daughter: breast cancer, sister: unknown cancer, grandson: yolk sac, multiple family members including mother, 2 daughters and maternal uncle with polycythemia  Meds: reviewed  Allergies: atorvastatin, penicillin, pravastatin    Labs:  05/24/23: wbc 7.41, hgb 15.3, plt 264, ANC 3.85,   02/07/2023:  WBC 6.2, hemoglobin 17.8, hematocrit 55.4, platelet count 264, ANC 3.26,  11/17/2022:  WBC 7.19, hemoglobin 18.0, hematocrit 35.9, MCV 98.1, , retic 1.18, ANC 4.76  8/22/22 WBC 6.45, Hgb 17.6, Hct 53.6, , ANC 3.71, Retic 1.25%  5/24/22 WBC 6.58, Hgb 17.6, Hct 53.7, , retic 0.88%  3/7/22 WBC 7.1 RBC 5.3 Hg 16.8 HCT 51   1/10/22 WBC 6.72, " Hgb 18.4, Hct 55.8,   10/25/21 WBC 6.44 Hg 18.9 HCT 57  Phlebotomy of 500ml  6/28/21 WBC 5.90, hgb 17.5, hct 53.6, RBC 5.69, , MCV  4/27/21  Ferritin 33 WBC 7.07 RBC 5.44 Hg 16.9 HCT 51.2 MCV 94.1  ANC 4.13  1/27/21 Hgb 18.3, Hct 56.9, ferritin 38  11/10/20 Hg 17.1 HCT 52.2  10/6/20 Hg 17.3 HCT 53  9/10/20 Hgb 16.9, Hct 52.6,  remainder of CBC WNL  5/7/20 JAK2 V617F detected but below limit of quantitation (limit of quantitation 0.5%), MPL neg, CALR neg  5/7/20 EPO receptor mutation analysis not detected in exon 7 or 8. VHL sequencing negative for mutation. EPO level 6. Testosterone 267, Cr 0.85  WBC 6.72 Hg 19.3 RBC 5.89 MCV 99 HCT 58.3   4/30/20 Cr 0.93 WBC 8.51 Hg 18.9 HCT 57.3 MCV 97.4 RDW 11.9  ANC 5.14 AEC 0.15 ABC 0.1 IG 0.03  3/5/20 Cr 0.9 Alb 3.9 TP 6.7 WBC 6.9 RBC 5.64 Hg 18.2 HCT 54.2 MCV 96.7 RDW 13   11/28/19 Hg 18.7 HCT 56.3  8/13/15 Hg 18.3 HCT 55.7    Imaging:  3/24/22 CT Chest: a few tiny (les than 5 mm) stable pulmonary nodules. There has been no adverse interval change since prior study.  10/2/20 CT chest w/o contrast: tiny b/l calcified and non calcified pulmonary nodules largest 2-3mm. Most likely benign, 12 month follow up recommended. Atherosclerosis and DJD. Ectasia of proximal descending thoracic aorta 3.4cm. Minimal b/l gynecomastia. Minimal bronchiectasis in RUL and RML  8/20/19 CXR: heart size normal, lungs are clear b/l. Pulm vasculature is normal    Path: none     Review of Systems  CONSTITUTIONAL: no fevers, no chills, no weight loss, no fatigue, no weakness  HEMATOLOGIC: no abnormal bleeding, no abnormal bruising, no drenching night sweats  ONCOLOGIC: no new masses or lumps  HEENT: no vision loss, no tinnitus or hearing loss, no nose bleeding, no dysphagia, no odynophagia  CVS: no chest pain, no palpitations, + dyspnea on exertion (unchanged)  RESP: no shortness of breath, no hemoptysis, no cough  GI: no nausea, no  "vomiting, no diarrhea, no constipation, no melena, no hematochezia, no hematemesis, no abdominal pain, no increase in abdominal girth  : no dysuria, no hematuria, no hesitancy, no scrotal swelling, no discharge  INTEGUMENT: no rashes, no abnormal bruising, no nail pitting, no hyperpigmentation  NEURO: no falls, no memory loss, no paresthesias or dysesthesias, no urofecal incontinence or retention, no loss of strength on any extremity  MSK: no back pain, no new joint pain, no joint swelling  PSYCH: no suicidal or homicidal ideation, no depression, no insomnia, no anhedonia  ENDOCRINE: no heat or cold intolerance, no polyuria, no polydipsia     Objective:     Physical Exam  Blood pressure (!) 172/78, pulse 71, temperature 97.8 °F (36.6 °C), temperature source Oral, resp. rate 18, height 5' 6" (1.676 m), weight 70.4 kg (155 lb 3.2 oz), SpO2 95 %.  ECOG PS 1  GA: AAOx3, NAD  HEENT: NCAT, PERRLA, EOMI, fair dentition, no oral ulcers  LYMPH: no cervical, axillary or supraclavicular adenopathy  CVS: s1s2 RRR, no M/R/G  RESP: CTA b/l but diminished bilaterally, no crackles, no wheezes or rhonchi. No labored breathing.   ABD: soft, NT, ND, BS+, no hepatosplenomegaly  EXT: no deformities, no pedal edema  SKIN: no rashes, no bruises or purpura, warm and dry  NEURO: normal mentation, strength 5/5 on all 4 extremities, no sensory deficits     Assessment/Plan:     1. Polycythemia D75.1   Etiology unclear but he is a smoker, has known COPD so chronic hypoxic state is likely. However some minimal increase in basophils noted which raises suspicion of MPN. JAK2 V617F detected but at very low levels, clinical significance uncertain  EPO level low normal.  Bone marrow biopsy was discussed in the past but he wanted to hold off at that point.  Sleep study was discussed he previously but he still wishes to hold off.  For secondary polycythemia no strict Hg/HCT goals but ideally target Hg <18.0 g/dl.  He is now only on Plavix.      2. 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 VHL sequencing of entire gene was negative. EPOR exon 7 and 8 negative  This does not exclude familial polycythemia. We may need to test BPGM, HIF2/EPAS1, methemoglobin or we may need full sequencing of EPOR and JAK2 genes for some cryptic mutations      3. Tobacco use Z72.0   Lung cancer screening. CT chest non contrast 10/2020 with 2-3mm b/l pulm nodules. Repeat obtained 3/2022 which showed stability.   He could not tolerate either Chantix, nicotine lozenge or nicotine patches. He is now not interested in quitting at presemt/         Plan  Patient's polycythemia is likely secondary to smoking.    Reviewed labs, noted hemoglobin less than 18 grams/deciliter.  Will continue CBC q.6 weeks with plans for phlebotomy if patient's hemoglobin is more than 18 grams/deciliter.    Discussed bone marrow biopsy to rule out myeloproliferative neoplasms however patient continues to decline it   Patient continues to be on Plavix for primary prevention under PCP's care.    Reinforced smoking cessation  Plan to follow-up in 3 months with CBC day prior.    A total of  30 minutes were spent in review of records, interpretation of test, coordination of care, discussion and counseling with the patient.

## 2023-08-29 PROBLEM — Z72.0 TOBACCO USER: Status: ACTIVE | Noted: 2023-02-14

## 2023-08-29 NOTE — PROGRESS NOTES
Subjective:       Patient ID: Wei Carbone is a 82 y.o. male.    Chief Complaint: Follow-up (No complaint)      Previous hematologist/oncologist:  Dr. Burden      History of Present Illness  Chief complaint: polycythemia    HPI: 80 y/o M w/ PMHx of HLD, PAD, COPD, esophageal stricture referred to [facility] for polycythemia    Review of his labs reveals stable polycythemia dating back to 2015    His daughter who was present during initial interview, reported polycythemia in patient's mother, in her self and another sister (so 2 of his children) as well as maternal uncle.      Interval History:    8/30/2023:  Mr. Carbone is here today for his 3-month follow-up for his polycythemia.  Today, he reports feeling well.  He confirms smoking 1-1/2 ppd cigarettes and has no interest in quitting at this time.  He denies headaches, dizziness, fatigue, blurred vision, itching after showering, and numbness and tingling in the hands and feet.  Labs reviewed with patient.  Hgb 15.9 and Hct 50.6.  No CMP reported for today.  His weight is stable. He is eating and drinking well.  No recent hospitalizations, infections, or illnesses.    PMHx: HLD, PAD, COPD, esophageal stricture  PSHx: tonsillectomy, eye surgery, carpal tunnel, esophageal dilatation, atherectomy and PTA of right and left SFA, PTA of left common iliac artery  Social Hx: current every day smoker since 1961, 1 ppd  Family Hx: daughter: breast cancer, sister: unknown cancer, grandson: yolk sac, multiple family members including mother, 2 daughters and maternal uncle with polycythemia  Meds: reviewed  Allergies: atorvastatin, penicillin, pravastatin    Labs:    8/30/2023:  Hgb 15.9 and Hct 40.6  05/24/23: wbc 7.41, hgb 15.3, plt 264, ANC 3.85,   02/07/2023:  WBC 6.2, hemoglobin 17.8, hematocrit 55.4, platelet count 264, ANC 3.26,  11/17/2022:  WBC 7.19, hemoglobin 18.0, hematocrit 35.9, MCV 98.1, , retic 1.18, ANC 4.76  8/22/22 WBC 6.45, Hgb 17.6, Hct 53.6, PLT  262, ANC 3.71, Retic 1.25%  5/24/22 WBC 6.58, Hgb 17.6, Hct 53.7, , retic 0.88%  3/7/22 WBC 7.1 RBC 5.3 Hg 16.8 HCT 51   1/10/22 WBC 6.72, Hgb 18.4, Hct 55.8,   10/25/21 WBC 6.44 Hg 18.9 HCT 57  Phlebotomy of 500ml  6/28/21 WBC 5.90, hgb 17.5, hct 53.6, RBC 5.69, , MCV  4/27/21  Ferritin 33 WBC 7.07 RBC 5.44 Hg 16.9 HCT 51.2 MCV 94.1  ANC 4.13  1/27/21 Hgb 18.3, Hct 56.9, ferritin 38  11/10/20 Hg 17.1 HCT 52.2  10/6/20 Hg 17.3 HCT 53  9/10/20 Hgb 16.9, Hct 52.6,  remainder of CBC WNL  5/7/20 JAK2 V617F detected but below limit of quantitation (limit of quantitation 0.5%), MPL neg, CALR neg  5/7/20 EPO receptor mutation analysis not detected in exon 7 or 8. VHL sequencing negative for mutation. EPO level 6. Testosterone 267, Cr 0.85  WBC 6.72 Hg 19.3 RBC 5.89 MCV 99 HCT 58.3   4/30/20 Cr 0.93 WBC 8.51 Hg 18.9 HCT 57.3 MCV 97.4 RDW 11.9  ANC 5.14 AEC 0.15 ABC 0.1 IG 0.03  3/5/20 Cr 0.9 Alb 3.9 TP 6.7 WBC 6.9 RBC 5.64 Hg 18.2 HCT 54.2 MCV 96.7 RDW 13   11/28/19 Hg 18.7 HCT 56.3  8/13/15 Hg 18.3 HCT 55.7    Imaging:  3/24/22 CT Chest: a few tiny (les than 5 mm) stable pulmonary nodules. There has been no adverse interval change since prior study.  10/2/20 CT chest w/o contrast: tiny b/l calcified and non calcified pulmonary nodules largest 2-3mm. Most likely benign, 12 month follow up recommended. Atherosclerosis and DJD. Ectasia of proximal descending thoracic aorta 3.4cm. Minimal b/l gynecomastia. Minimal bronchiectasis in RUL and RML  8/20/19 CXR: heart size normal, lungs are clear b/l. Pulm vasculature is normal    Path: none     Review of Systems  CONSTITUTIONAL: no fevers, no chills, no weight loss, no fatigue, no weakness  HEMATOLOGIC: no abnormal bleeding, no abnormal bruising, no drenching night sweats  ONCOLOGIC: no new masses or lumps  HEENT: no vision loss, no tinnitus or hearing loss, no nose bleeding, no dysphagia, no  "odynophagia  CVS: no chest pain, no palpitations, + dyspnea on exertion (unchanged)  RESP: no shortness of breath, no hemoptysis, no cough  GI: no nausea, no vomiting, no diarrhea, no constipation, no melena, no hematochezia, no hematemesis, no abdominal pain, no increase in abdominal girth  : no dysuria, no hematuria, no hesitancy, no scrotal swelling, no discharge  INTEGUMENT: no rashes, no abnormal bruising, no nail pitting, no hyperpigmentation  NEURO: no falls, no memory loss, no paresthesias or dysesthesias, no urofecal incontinence or retention, no loss of strength on any extremity  MSK: no back pain, no new joint pain, no joint swelling  PSYCH: no suicidal or homicidal ideation, no depression, no insomnia, no anhedonia  ENDOCRINE: no heat or cold intolerance, no polyuria, no polydipsia     Objective:     Physical Exam  Blood pressure (!) 151/78, pulse 66, temperature 97.8 °F (36.6 °C), temperature source Oral, resp. rate 20, height 5' 6" (1.676 m), weight 71 kg (156 lb 9.6 oz), SpO2 96 %.  ECOG PS 1  GA: AAOx3, NAD  HEENT: NCAT, PERRLA, EOMI, fair dentition, no oral ulcers  LYMPH: no cervical, axillary or supraclavicular adenopathy  CVS: s1s2 RRR, no M/R/G  RESP: CTA b/l but diminished bilaterally, no crackles, no wheezes or rhonchi. No labored breathing.   ABD: soft, NT, ND, BS+, no hepatosplenomegaly  EXT: no deformities, no pedal edema  SKIN: no rashes, no bruises or purpura, warm and dry  NEURO: normal mentation, strength 5/5 on all 4 extremities, no sensory deficits     Assessment/Plan:     1. Polycythemia D75.1   Etiology unclear but he is a smoker, has known COPD so chronic hypoxic state is likely. However some minimal increase in basophils noted which raises suspicion of MPN. JAK2 V617F detected but at very low levels, clinical significance uncertain  EPO level low normal.  Bone marrow biopsy was discussed in the past but he wanted to hold off at that point.  Sleep study was discussed he previously " but he still wishes to hold off.  For secondary polycythemia no strict Hg/HCT goals but ideally target Hg <18.0 g/dl.  He is now only on Plavix.      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 VHL sequencing of entire gene was negative. EPOR exon 7 and 8 negative  This does not exclude familial polycythemia. We may need to test BPGM, HIF2/EPAS1, methemoglobin or we may need full sequencing of EPOR and JAK2 genes for some cryptic mutations      3. Tobacco use Z72.0   Lung cancer screening. CT chest non contrast 10/2020 with 2-3mm b/l pulm nodules. Repeat obtained 3/2022 which showed stability.   He could not tolerate either Chantix, nicotine lozenge or nicotine patches. He is now not interested in quitting at present.     8/30/2023:  Patient doing well overall.  Asymptomatic subjective exam.  Hgb and Hct WNL.  Continues to smoke.  Does not want smoking cessation.      8/30/2023:  Plan  Patient continues to be on Plavix for primary prevention under PCP's care.    Reinforced smoking cessation  Plan to follow-up in 3 months with CBC day prior.    The patient is in agreement with today's plan of care.  All questions answered.  Patient is aware to contact our office for any problems or concerns prior to next visit.      Amanda Hays, MSN-ED, APRN, AGACNP-BC, OCN

## 2023-08-30 ENCOUNTER — OFFICE VISIT (OUTPATIENT)
Dept: HEMATOLOGY/ONCOLOGY | Facility: CLINIC | Age: 83
End: 2023-08-30
Payer: MEDICARE

## 2023-08-30 VITALS
DIASTOLIC BLOOD PRESSURE: 78 MMHG | WEIGHT: 156.63 LBS | TEMPERATURE: 98 F | HEIGHT: 66 IN | RESPIRATION RATE: 20 BRPM | BODY MASS INDEX: 25.17 KG/M2 | OXYGEN SATURATION: 96 % | SYSTOLIC BLOOD PRESSURE: 151 MMHG | HEART RATE: 66 BPM

## 2023-08-30 DIAGNOSIS — D75.1 ERYTHROCYTOSIS: Primary | ICD-10-CM

## 2023-08-30 DIAGNOSIS — Z72.0 TOBACCO USER: ICD-10-CM

## 2023-08-30 PROCEDURE — 3077F SYST BP >= 140 MM HG: CPT | Mod: CPTII,,, | Performed by: NURSE PRACTITIONER

## 2023-08-30 PROCEDURE — 3078F DIAST BP <80 MM HG: CPT | Mod: CPTII,,, | Performed by: NURSE PRACTITIONER

## 2023-08-30 PROCEDURE — 3288F PR FALLS RISK ASSESSMENT DOCUMENTED: ICD-10-PCS | Mod: CPTII,,, | Performed by: NURSE PRACTITIONER

## 2023-08-30 PROCEDURE — 1101F PT FALLS ASSESS-DOCD LE1/YR: CPT | Mod: CPTII,,, | Performed by: NURSE PRACTITIONER

## 2023-08-30 PROCEDURE — 3078F PR MOST RECENT DIASTOLIC BLOOD PRESSURE < 80 MM HG: ICD-10-PCS | Mod: CPTII,,, | Performed by: NURSE PRACTITIONER

## 2023-08-30 PROCEDURE — 99213 PR OFFICE/OUTPT VISIT, EST, LEVL III, 20-29 MIN: ICD-10-PCS | Mod: ,,, | Performed by: NURSE PRACTITIONER

## 2023-08-30 PROCEDURE — 1159F MED LIST DOCD IN RCRD: CPT | Mod: CPTII,,, | Performed by: NURSE PRACTITIONER

## 2023-08-30 PROCEDURE — 3077F PR MOST RECENT SYSTOLIC BLOOD PRESSURE >= 140 MM HG: ICD-10-PCS | Mod: CPTII,,, | Performed by: NURSE PRACTITIONER

## 2023-08-30 PROCEDURE — 1160F RVW MEDS BY RX/DR IN RCRD: CPT | Mod: CPTII,,, | Performed by: NURSE PRACTITIONER

## 2023-08-30 PROCEDURE — 1160F PR REVIEW ALL MEDS BY PRESCRIBER/CLIN PHARMACIST DOCUMENTED: ICD-10-PCS | Mod: CPTII,,, | Performed by: NURSE PRACTITIONER

## 2023-08-30 PROCEDURE — 1101F PR PT FALLS ASSESS DOC 0-1 FALLS W/OUT INJ PAST YR: ICD-10-PCS | Mod: CPTII,,, | Performed by: NURSE PRACTITIONER

## 2023-08-30 PROCEDURE — 3288F FALL RISK ASSESSMENT DOCD: CPT | Mod: CPTII,,, | Performed by: NURSE PRACTITIONER

## 2023-08-30 PROCEDURE — 99213 OFFICE O/P EST LOW 20 MIN: CPT | Mod: ,,, | Performed by: NURSE PRACTITIONER

## 2023-08-30 PROCEDURE — 1126F AMNT PAIN NOTED NONE PRSNT: CPT | Mod: CPTII,,, | Performed by: NURSE PRACTITIONER

## 2023-08-30 PROCEDURE — 1159F PR MEDICATION LIST DOCUMENTED IN MEDICAL RECORD: ICD-10-PCS | Mod: CPTII,,, | Performed by: NURSE PRACTITIONER

## 2023-08-30 PROCEDURE — 1126F PR PAIN SEVERITY QUANTIFIED, NO PAIN PRESENT: ICD-10-PCS | Mod: CPTII,,, | Performed by: NURSE PRACTITIONER

## 2023-12-15 ENCOUNTER — OFFICE VISIT (OUTPATIENT)
Dept: HEMATOLOGY/ONCOLOGY | Facility: CLINIC | Age: 83
End: 2023-12-15
Payer: MEDICARE

## 2023-12-15 VITALS
HEIGHT: 66 IN | WEIGHT: 152.19 LBS | OXYGEN SATURATION: 97 % | HEART RATE: 83 BPM | BODY MASS INDEX: 24.46 KG/M2 | RESPIRATION RATE: 22 BRPM | DIASTOLIC BLOOD PRESSURE: 89 MMHG | SYSTOLIC BLOOD PRESSURE: 173 MMHG | TEMPERATURE: 98 F

## 2023-12-15 DIAGNOSIS — D75.1 ERYTHROCYTOSIS: Primary | ICD-10-CM

## 2023-12-15 DIAGNOSIS — Z72.0 TOBACCO USER: ICD-10-CM

## 2023-12-15 PROCEDURE — 1101F PR PT FALLS ASSESS DOC 0-1 FALLS W/OUT INJ PAST YR: ICD-10-PCS | Mod: CPTII,,,

## 2023-12-15 PROCEDURE — 3288F PR FALLS RISK ASSESSMENT DOCUMENTED: ICD-10-PCS | Mod: CPTII,,,

## 2023-12-15 PROCEDURE — 3077F PR MOST RECENT SYSTOLIC BLOOD PRESSURE >= 140 MM HG: ICD-10-PCS | Mod: CPTII,,,

## 2023-12-15 PROCEDURE — 1101F PT FALLS ASSESS-DOCD LE1/YR: CPT | Mod: CPTII,,,

## 2023-12-15 PROCEDURE — 99214 OFFICE O/P EST MOD 30 MIN: CPT | Mod: ,,,

## 2023-12-15 PROCEDURE — 99214 PR OFFICE/OUTPT VISIT, EST, LEVL IV, 30-39 MIN: ICD-10-PCS | Mod: ,,,

## 2023-12-15 PROCEDURE — 1159F MED LIST DOCD IN RCRD: CPT | Mod: CPTII,,,

## 2023-12-15 PROCEDURE — 3288F FALL RISK ASSESSMENT DOCD: CPT | Mod: CPTII,,,

## 2023-12-15 PROCEDURE — 3079F DIAST BP 80-89 MM HG: CPT | Mod: CPTII,,,

## 2023-12-15 PROCEDURE — 3079F PR MOST RECENT DIASTOLIC BLOOD PRESSURE 80-89 MM HG: ICD-10-PCS | Mod: CPTII,,,

## 2023-12-15 PROCEDURE — 1159F PR MEDICATION LIST DOCUMENTED IN MEDICAL RECORD: ICD-10-PCS | Mod: CPTII,,,

## 2023-12-15 PROCEDURE — 3077F SYST BP >= 140 MM HG: CPT | Mod: CPTII,,,

## 2023-12-15 RX ORDER — ACETAMINOPHEN 325 MG/1
325 TABLET ORAL EVERY 6 HOURS PRN
COMMUNITY

## 2023-12-15 NOTE — PROGRESS NOTES
Subjective:       Patient ID: Wei Carbone is a 83 y.o. male.    Chief Complaint: Other (States having trouble urinating and passing stool, states he is going to restroom often to urinate but he is constipated )      Previous hematologist/oncologist:  Dr. Burden      History of Present Illness  Chief complaint: polycythemia    HPI: 80 y/o M w/ PMHx of HLD, PAD, COPD, esophageal stricture referred to [facility] for polycythemia    Review of his labs reveals stable polycythemia dating back to 2015    His daughter who was present during initial interview, reported polycythemia in patient's mother, in her self and another sister (so 2 of his children) as well as maternal uncle.      Interval History:    12/15/2023:  Mr. Carbone is here today for follow-up for his polycythemia.  He is doing well and has no concerns. He continues smoking 1-1/2 ppd cigarettes and has no interest in quitting at this time.  He denies headaches, dizziness, fatigue, blurred vision, itching after showering, and numbness and tingling in the hands and feet.       PMHx: HLD, PAD, COPD, esophageal stricture  PSHx: tonsillectomy, eye surgery, carpal tunnel, esophageal dilatation, atherectomy and PTA of right and left SFA, PTA of left common iliac artery  Social Hx: current every day smoker since 1961, 1 ppd  Family Hx: daughter: breast cancer, sister: unknown cancer, grandson: yolk sac, multiple family members including mother, 2 daughters and maternal uncle with polycythemia  Meds: reviewed  Allergies: atorvastatin, penicillin, pravastatin    Labs:  11/27/23: wbc 6.58, hgb 15.8, plt 342, ANC 3.31, CMP unremarkable,   8/30/2023:  Hgb 15.9 and Hct 40.6  05/24/23: wbc 7.41, hgb 15.3, plt 264, ANC 3.85,   02/07/2023:  WBC 6.2, hemoglobin 17.8, hematocrit 55.4, platelet count 264, ANC 3.26,  11/17/2022:  WBC 7.19, hemoglobin 18.0, hematocrit 35.9, MCV 98.1, , retic 1.18, ANC 4.76  8/22/22 WBC 6.45, Hgb 17.6, Hct 53.6, , ANC 3.71, Retic  1.25%  22 WBC 6.58, Hgb 17.6, Hct 53.7, , retic 0.88%  3/7/22 WBC 7.1 RBC 5.3 Hg 16.8 HCT 51   1/10/22 WBC 6.72, Hgb 18.4, Hct 55.8,   10/25/21 WBC 6.44 Hg 18.9 HCT 57  Phlebotomy of 500ml  21 WBC 5.90, hgb 17.5, hct 53.6, RBC 5.69, , MCV  21  Ferritin 33 WBC 7.07 RBC 5.44 Hg 16.9 HCT 51.2 MCV 94.1  ANC 4.13  21 Hgb 18.3, Hct 56.9, ferritin 38  11/10/20 Hg 17.1 HCT 52.2  10/6/20 Hg 17.3 HCT 53  9/10/20 Hgb 16.9, Hct 52.6,  remainder of CBC WNL  20 JAK2 V617F detected but below limit of quantitation (limit of quantitation 0.5%), MPL neg, CALR neg  20 EPO receptor mutation analysis not detected in exon 7 or 8. VHL sequencing negative for mutation. EPO level 6. Testosterone 267, Cr 0.85  WBC 6.72 Hg 19.3 RBC 5.89 MCV 99 HCT 58.3   20 Cr 0.93 WBC 8.51 Hg 18.9 HCT 57.3 MCV 97.4 RDW 11.9  ANC 5.14 AEC 0.15 ABC 0.1 IG 0.03  3/5/20 Cr 0.9 Alb 3.9 TP 6.7 WBC 6.9 RBC 5.64 Hg 18.2 HCT 54.2 MCV 96.7 RDW 13   19 Hg 18.7 HCT 56.3  8/13/15 Hg 18.3 HCT 55.7    Imagin23 Colonoscopy with normal results except for diverticulosis  3/24/22 CT Chest: a few tiny (les than 5 mm) stable pulmonary nodules. There has been no adverse interval change since prior study.  10/2/20 CT chest w/o contrast: tiny b/l calcified and non calcified pulmonary nodules largest 2-3mm. Most likely benign, 12 month follow up recommended. Atherosclerosis and DJD. Ectasia of proximal descending thoracic aorta 3.4cm. Minimal b/l gynecomastia. Minimal bronchiectasis in RUL and RML  19 CXR: heart size normal, lungs are clear b/l. Pulm vasculature is normal    Path:   23 Esophagus Bx Benign squamous mucosa. No eosinophils identified, no intestinal metaplasia.     Review of Systems  CONSTITUTIONAL: no fevers, no chills, no weight loss, no fatigue, no weakness  HEMATOLOGIC: no abnormal bleeding, no abnormal bruising, no  "drenching night sweats  ONCOLOGIC: no new masses or lumps  HEENT: no vision loss, no tinnitus or hearing loss, no nose bleeding, no dysphagia, no odynophagia  CVS: no chest pain, no palpitations, + dyspnea on exertion (unchanged)  RESP: no shortness of breath, no hemoptysis, no cough  GI: no nausea, no vomiting, no diarrhea, no constipation, no melena, no hematochezia, no hematemesis, no abdominal pain, no increase in abdominal girth  : no dysuria, no hematuria, no hesitancy, no scrotal swelling, no discharge  INTEGUMENT: no rashes, no abnormal bruising, no nail pitting, no hyperpigmentation  NEURO: no falls, no memory loss, no paresthesias or dysesthesias, no urofecal incontinence or retention, no loss of strength on any extremity  MSK: no back pain, no new joint pain, no joint swelling  PSYCH: no suicidal or homicidal ideation, no depression, no insomnia, no anhedonia  ENDOCRINE: no heat or cold intolerance, no polyuria, no polydipsia     Objective:     Physical Exam  Blood pressure (!) 173/89, pulse 83, temperature 97.5 °F (36.4 °C), temperature source Oral, resp. rate (!) 22, height 5' 6" (1.676 m), weight 69 kg (152 lb 3.2 oz), SpO2 97 %.  ECOG PS 1  GA: AAOx3, NAD  HEENT: NCAT, PERRLA, EOMI, fair dentition, no oral ulcers  LYMPH: no cervical, axillary or supraclavicular adenopathy  CVS: s1s2 RRR, no M/R/G  RESP: CTA b/l but diminished bilaterally, no crackles, no wheezes or rhonchi. No labored breathing.   ABD: soft, NT, ND, BS+, no hepatosplenomegaly  EXT: no deformities, no pedal edema  SKIN: no rashes, no bruises or purpura, warm and dry  NEURO: normal mentation, strength 5/5 on all 4 extremities, no sensory deficits     Assessment/Plan:     1. Polycythemia D75.1   Etiology unclear but he is a smoker, has known COPD so chronic hypoxic state is likely. However some minimal increase in basophils noted which raises suspicion of MPN. JAK2 V617F detected but at very low levels, clinical significance " uncertain  EPO level low normal.  Bone marrow biopsy was discussed in the past but he wanted to hold off at that point.  Sleep study was discussed he previously but he still wishes to hold off.  For secondary polycythemia no strict Hg/HCT goals but ideally target Hg <18.0 g/dl.  He is now only on Plavix.      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 VHL sequencing of entire gene was negative. EPOR exon 7 and 8 negative  This does not exclude familial polycythemia. We may need to test BPGM, HIF2/EPAS1, methemoglobin or we may need full sequencing of EPOR and JAK2 genes for some cryptic mutations      3. Tobacco use Z72.0   Lung cancer screening. CT chest non contrast 10/2020 with 2-3mm b/l pulm nodules. Repeat obtained 3/2022 which showed stability.   He could not tolerate either Chantix, nicotine lozenge or nicotine patches. He is now not interested in quitting at present.    Plan  Labs reviewed. Hgb/Hct stable. No phlebotomy needed at this time.  Patient continues to be on Plavix for primary prevention under PCP's care.    Reinforced smoking cessation  Telephone visit  4 months with CBC day prior.    The patient is in agreement with today's plan of care.  All questions answered.  Patient is aware to contact our office for any problems or concerns prior to next visit.

## 2024-04-26 ENCOUNTER — OFFICE VISIT (OUTPATIENT)
Dept: HEMATOLOGY/ONCOLOGY | Facility: CLINIC | Age: 84
End: 2024-04-26
Payer: MEDICARE

## 2024-04-26 VITALS — WEIGHT: 140 LBS | HEIGHT: 66 IN | BODY MASS INDEX: 22.5 KG/M2

## 2024-04-26 DIAGNOSIS — D75.1 ERYTHROCYTOSIS: Primary | ICD-10-CM

## 2024-04-26 PROCEDURE — 3288F FALL RISK ASSESSMENT DOCD: CPT | Mod: CPTII,95,,

## 2024-04-26 PROCEDURE — 99442 PR PHYSICIAN TELEPHONE EVALUATION 11-20 MIN: CPT | Mod: 95,,,

## 2024-04-26 PROCEDURE — 1159F MED LIST DOCD IN RCRD: CPT | Mod: CPTII,95,,

## 2024-04-26 PROCEDURE — 1101F PT FALLS ASSESS-DOCD LE1/YR: CPT | Mod: CPTII,95,,

## 2024-04-26 RX ORDER — LINACLOTIDE 145 UG/1
145 CAPSULE, GELATIN COATED ORAL
COMMUNITY
Start: 2023-11-21

## 2024-04-26 RX ORDER — TAMSULOSIN HYDROCHLORIDE 0.4 MG/1
0.4 CAPSULE ORAL 3 TIMES DAILY
COMMUNITY

## 2024-04-26 NOTE — PROGRESS NOTES
Established Patient - Audio Only Telehealth Visit     The patient location is: Home  The chief complaint leading to consultation is: Erythrocytosis  Visit type: Virtual visit with audio only (telephone)  Total time spent with patient: 15 minutes       The reason for the audio only service rather than synchronous audio and video virtual visit was related to technical difficulties or patient preference/necessity.     Each patient to whom I provide medical services by telemedicine is:  (1) informed of the relationship between the physician and patient and the respective role of any other health care provider with respect to management of the patient; and (2) notified that they may decline to receive medical services by telemedicine and may withdraw from such care at any time. Patient verbally consented to receive this service via voice-only telephone call.    Subjective:       Patient ID: Wei Carbone is a 83 y.o. male.    Chief Complaint: No chief complaint on file.      Previous hematologist/oncologist:  Dr. Burden      History of Present Illness  Chief complaint: polycythemia    HPI: 82 y/o M w/ PMHx of HLD, PAD, COPD, esophageal stricture referred to [facility] for polycythemia    Review of his labs reveals stable polycythemia dating back to 2015    His daughter who was present during initial interview, reported polycythemia in patient's mother, in her self and another sister (so 2 of his children) as well as maternal uncle.      Interval History:    04/26/24:  Mr. Carbone is here today for follow-up for his polycythemia.  He is doing well and has no concerns. He continues smoking but has decreased number of cigarettes per day.  He denies headaches, dizziness, fatigue, blurred vision, itching after showering, and numbness and tingling in the hands and feet.       PMHx: HLD, PAD, COPD, esophageal stricture  PSHx: tonsillectomy, eye surgery, carpal tunnel, esophageal dilatation, atherectomy and PTA of right and left  SFA, PTA of left common iliac artery  Social Hx: current every day smoker since , 1 ppd  Family Hx: daughter: breast cancer, sister: unknown cancer, grandson: yolk sac, multiple family members including mother, 2 daughters and maternal uncle with polycythemia  Meds: reviewed  Allergies: atorvastatin, penicillin, pravastatin    Labs:  24: CMP unremarkable, wbc 4.32, hgb 14.1, HCT 46.7, plt 263, ANC 2.50  23: wbc 6.58, hgb 15.8, plt 342, ANC 3.31, CMP unremarkable,   2023:  Hgb 15.9 and Hct 40.6  23: wbc 7.41, hgb 15.3, plt 264, ANC 3.85,   2023:  WBC 6.2, hemoglobin 17.8, hematocrit 55.4, platelet count 264, ANC 3.26,  2022:  WBC 7.19, hemoglobin 18.0, hematocrit 35.9, MCV 98.1, , retic 1.18, ANC 4.76  22 WBC 6.45, Hgb 17.6, Hct 53.6, , ANC 3.71, Retic 1.25%  22 WBC 6.58, Hgb 17.6, Hct 53.7, , retic 0.88%  3/7/22 WBC 7.1 RBC 5.3 Hg 16.8 HCT 51   1/10/22 WBC 6.72, Hgb 18.4, Hct 55.8,   10/25/21 WBC 6.44 Hg 18.9 HCT 57  Phlebotomy of 500ml  21 WBC 5.90, hgb 17.5, hct 53.6, RBC 5.69, , MCV  21  Ferritin 33 WBC 7.07 RBC 5.44 Hg 16.9 HCT 51.2 MCV 94.1  ANC 4.13  21 Hgb 18.3, Hct 56.9, ferritin 38  11/10/20 Hg 17.1 HCT 52.2  10/6/20 Hg 17.3 HCT 53  9/10/20 Hgb 16.9, Hct 52.6,  remainder of CBC WNL  20 JAK2 V617F detected but below limit of quantitation (limit of quantitation 0.5%), MPL neg, CALR neg  20 EPO receptor mutation analysis not detected in exon 7 or 8. VHL sequencing negative for mutation. EPO level 6. Testosterone 267, Cr 0.85  WBC 6.72 Hg 19.3 RBC 5.89 MCV 99 HCT 58.3   20 Cr 0.93 WBC 8.51 Hg 18.9 HCT 57.3 MCV 97.4 RDW 11.9  ANC 5.14 AEC 0.15 ABC 0.1 IG 0.03  3/5/20 Cr 0.9 Alb 3.9 TP 6.7 WBC 6.9 RBC 5.64 Hg 18.2 HCT 54.2 MCV 96.7 RDW 13   19 Hg 18.7 HCT 56.3  8/13/15 Hg 18.3 HCT 55.7    Imagin23 Colonoscopy with normal  results except for diverticulosis  3/24/22 CT Chest: a few tiny (les than 5 mm) stable pulmonary nodules. There has been no adverse interval change since prior study.  10/2/20 CT chest w/o contrast: tiny b/l calcified and non calcified pulmonary nodules largest 2-3mm. Most likely benign, 12 month follow up recommended. Atherosclerosis and DJD. Ectasia of proximal descending thoracic aorta 3.4cm. Minimal b/l gynecomastia. Minimal bronchiectasis in RUL and RML  8/20/19 CXR: heart size normal, lungs are clear b/l. Pulm vasculature is normal    Path:   11/30/23 Esophagus Bx Benign squamous mucosa. No eosinophils identified, no intestinal metaplasia.     Review of Systems  CONSTITUTIONAL: no fevers, no chills, no weight loss, no fatigue, no weakness  HEMATOLOGIC: no abnormal bleeding, no abnormal bruising, no drenching night sweats  ONCOLOGIC: no new masses or lumps  HEENT: no vision loss, no tinnitus or hearing loss, no nose bleeding, no dysphagia, no odynophagia  CVS: no chest pain, no palpitations, + dyspnea on exertion (unchanged)  RESP: no shortness of breath, no hemoptysis, no cough  GI: no nausea, no vomiting, no diarrhea, no constipation, no melena, no hematochezia, no hematemesis, no abdominal pain, no increase in abdominal girth  : no dysuria, no hematuria, no hesitancy, no scrotal swelling, no discharge  INTEGUMENT: no rashes, no abnormal bruising, no nail pitting, no hyperpigmentation  NEURO: no falls, no memory loss, no paresthesias or dysesthesias, no urofecal incontinence or retention, no loss of strength on any extremity  MSK: no back pain, no new joint pain, no joint swelling  PSYCH: no suicidal or homicidal ideation, no depression, no insomnia, no anhedonia  ENDOCRINE: no heat or cold intolerance, no polyuria, no polydipsia     Objective:     Physical Exam  There were no vitals taken for this visit.  ECOG PS 1  GA: AAOx3, NAD  HEENT: NCAT, PERRLA, EOMI, fair dentition, no oral ulcers  LYMPH: no  cervical, axillary or supraclavicular adenopathy  CVS: s1s2 RRR, no M/R/G  RESP: CTA b/l but diminished bilaterally, no crackles, no wheezes or rhonchi. No labored breathing.   ABD: soft, NT, ND, BS+, no hepatosplenomegaly  EXT: no deformities, no pedal edema  SKIN: no rashes, no bruises or purpura, warm and dry  NEURO: normal mentation, strength 5/5 on all 4 extremities, no sensory deficits     Assessment/Plan:     1. Polycythemia D75.1   Etiology unclear but he is a smoker, has known COPD so chronic hypoxic state is likely. However some minimal increase in basophils noted which raises suspicion of MPN. JAK2 V617F detected but at very low levels, clinical significance uncertain  EPO level low normal.  Bone marrow biopsy was discussed in the past but he wanted to hold off at that point.  Sleep study was discussed he previously but he still wishes to hold off.  For secondary polycythemia no strict Hg/HCT goals but ideally target Hg <18.0 g/dl.  He is now only on Plavix.      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 VHL sequencing of entire gene was negative. EPOR exon 7 and 8 negative  This does not exclude familial polycythemia. We may need to test BPGM, HIF2/EPAS1, methemoglobin or we may need full sequencing of EPOR and JAK2 genes for some cryptic mutations      3. Tobacco use Z72.0   Lung cancer screening. CT chest non contrast 10/2020 with 2-3mm b/l pulm nodules. Repeat obtained 3/2022 which showed stability.   He could not tolerate either Chantix, nicotine lozenge or nicotine patches. He is now not interested in quitting at present.    Plan  Labs reviewed. Hgb/Hct stable. No phlebotomy needed at this time.  Patient continues to be on Plavix for primary prevention under PCP's care.    Reinforced smoking cessation. He declines at this time but will continue smoking less.  Telephone visit 4 months with CBC day  prior.    The patient is in agreement with today's plan of care.  All questions answered.  Patient is aware to contact our office for any problems or concerns prior to next visit.                         This service was not originating from a related E/M service provided within the previous 7 days nor will  to an E/M service or procedure within the next 24 hours or my soonest available appointment.  Prevailing standard of care was able to be met in this audio-only visit.

## 2024-08-30 ENCOUNTER — OFFICE VISIT (OUTPATIENT)
Dept: HEMATOLOGY/ONCOLOGY | Facility: CLINIC | Age: 84
End: 2024-08-30
Payer: MEDICARE

## 2024-08-30 VITALS — WEIGHT: 137 LBS | HEIGHT: 66 IN | BODY MASS INDEX: 22.02 KG/M2

## 2024-08-30 DIAGNOSIS — D75.1 SECONDARY ERYTHROCYTOSIS: Primary | ICD-10-CM

## 2024-08-30 RX ORDER — POLYETHYLENE GLYCOL 3350 17 G/17G
17 POWDER, FOR SOLUTION ORAL DAILY
COMMUNITY

## 2024-08-30 RX ORDER — PANTOPRAZOLE SODIUM 40 MG/1
40 TABLET, DELAYED RELEASE ORAL DAILY
COMMUNITY
Start: 2024-08-01

## 2024-08-30 NOTE — PROGRESS NOTES
Established Patient - Audio Only Telehealth Visit     The patient location is: Home  The chief complaint leading to consultation is: Secondary Erythrocytosis  Visit type: Virtual visit with audio only (telephone)  Total time spent with patient: 15 minutes       The reason for the audio only service rather than synchronous audio and video virtual visit was related to technical difficulties or patient preference/necessity.     Each patient to whom I provide medical services by telemedicine is:  (1) informed of the relationship between the physician and patient and the respective role of any other health care provider with respect to management of the patient; and (2) notified that they may decline to receive medical services by telemedicine and may withdraw from such care at any time. Patient verbally consented to receive this service via voice-only telephone call.    Subjective:       Patient ID: Wei Carbone is a 83 y.o. male.    Chief Complaint: No complaint      Previous hematologist/oncologist:  Dr. Burden      History of Present Illness  Chief complaint: polycythemia    HPI: 80 y/o M w/ PMHx of HLD, PAD, COPD, esophageal stricture referred to [facility] for polycythemia    Review of his labs reveals stable polycythemia dating back to 2015    His daughter who was present during initial interview, reported polycythemia in patient's mother, in her self and another sister (so 2 of his children) as well as maternal uncle.      Interval History:    08/30/24:  Mr. Carbone is here today for follow-up for his polycythemia.  He is doing well and has no concerns. He continues smoking, He reports losing his brother since our last visit due to septicemia.  He denies headaches, dizziness, fatigue, blurred vision, itching after showering, and numbness and tingling in the hands and feet.       PMHx: HLD, PAD, COPD, esophageal stricture  PSHx: tonsillectomy, eye surgery, carpal tunnel, esophageal dilatation, atherectomy and  PTA of right and left SFA, PTA of left common iliac artery  Social Hx: current every day smoker since 1961, 1 ppd  Family Hx: daughter: breast cancer, sister: unknown cancer, grandson: yolk sac, multiple family members including mother, 2 daughters and maternal uncle with polycythemia  Meds: reviewed  Allergies: atorvastatin, penicillin, pravastatin    Labs:  08/26/24: CMP unremarkable, wbc 5.64, hgb 14.0, HCT 45.5, plt 239, ANC 2.95  04/22/24: CMP unremarkable, wbc 4.32, hgb 14.1, HCT 46.7, plt 263, ANC 2.50  11/27/23: wbc 6.58, hgb 15.8, plt 342, ANC 3.31, CMP unremarkable,   8/30/2023:  Hgb 15.9 and Hct 40.6  05/24/23: wbc 7.41, hgb 15.3, plt 264, ANC 3.85,   02/07/2023:  WBC 6.2, hemoglobin 17.8, hematocrit 55.4, platelet count 264, ANC 3.26,  11/17/2022:  WBC 7.19, hemoglobin 18.0, hematocrit 35.9, MCV 98.1, , retic 1.18, ANC 4.76  8/22/22 WBC 6.45, Hgb 17.6, Hct 53.6, , ANC 3.71, Retic 1.25%  5/24/22 WBC 6.58, Hgb 17.6, Hct 53.7, , retic 0.88%  3/7/22 WBC 7.1 RBC 5.3 Hg 16.8 HCT 51   1/10/22 WBC 6.72, Hgb 18.4, Hct 55.8,   10/25/21 WBC 6.44 Hg 18.9 HCT 57  Phlebotomy of 500ml  6/28/21 WBC 5.90, hgb 17.5, hct 53.6, RBC 5.69, , MCV  4/27/21  Ferritin 33 WBC 7.07 RBC 5.44 Hg 16.9 HCT 51.2 MCV 94.1  ANC 4.13  1/27/21 Hgb 18.3, Hct 56.9, ferritin 38  11/10/20 Hg 17.1 HCT 52.2  10/6/20 Hg 17.3 HCT 53  9/10/20 Hgb 16.9, Hct 52.6,  remainder of CBC WNL  5/7/20 JAK2 V617F detected but below limit of quantitation (limit of quantitation 0.5%), MPL neg, CALR neg  5/7/20 EPO receptor mutation analysis not detected in exon 7 or 8. VHL sequencing negative for mutation. EPO level 6. Testosterone 267, Cr 0.85  WBC 6.72 Hg 19.3 RBC 5.89 MCV 99 HCT 58.3   4/30/20 Cr 0.93 WBC 8.51 Hg 18.9 HCT 57.3 MCV 97.4 RDW 11.9  ANC 5.14 AEC 0.15 ABC 0.1 IG 0.03  3/5/20 Cr 0.9 Alb 3.9 TP 6.7 WBC 6.9 RBC 5.64 Hg 18.2 HCT 54.2 MCV 96.7 RDW 13 PLT  "229  19 Hg 18.7 HCT 56.3  8/13/15 Hg 18.3 HCT 55.7    Imagin23 Colonoscopy with normal results except for diverticulosis  3/24/22 CT Chest: a few tiny (les than 5 mm) stable pulmonary nodules. There has been no adverse interval change since prior study.  10/2/20 CT chest w/o contrast: tiny b/l calcified and non calcified pulmonary nodules largest 2-3mm. Most likely benign, 12 month follow up recommended. Atherosclerosis and DJD. Ectasia of proximal descending thoracic aorta 3.4cm. Minimal b/l gynecomastia. Minimal bronchiectasis in RUL and RML  19 CXR: heart size normal, lungs are clear b/l. Pulm vasculature is normal    Path:   23 Esophagus Bx Benign squamous mucosa. No eosinophils identified, no intestinal metaplasia.     Review of Systems  CONSTITUTIONAL: no fevers, no chills, no weight loss, no fatigue, no weakness  HEMATOLOGIC: no abnormal bleeding, no abnormal bruising, no drenching night sweats  ONCOLOGIC: no new masses or lumps  HEENT: no vision loss, no tinnitus or hearing loss, no nose bleeding, no dysphagia, no odynophagia  CVS: no chest pain, no palpitations, + dyspnea on exertion (unchanged)  RESP: no shortness of breath, no hemoptysis, no cough  GI: no nausea, no vomiting, no diarrhea, no constipation, no melena, no hematochezia, no hematemesis, no abdominal pain, no increase in abdominal girth  : no dysuria, no hematuria, no hesitancy, no scrotal swelling, no discharge  INTEGUMENT: no rashes, no abnormal bruising, no nail pitting, no hyperpigmentation  NEURO: no falls, no memory loss, no paresthesias or dysesthesias, no urofecal incontinence or retention, no loss of strength on any extremity  MSK: no back pain, no new joint pain, no joint swelling  PSYCH: no suicidal or homicidal ideation, no depression, no insomnia, no anhedonia  ENDOCRINE: no heat or cold intolerance, no polyuria, no polydipsia     Objective:     Physical Exam  Height 5' 6" (1.676 m), weight 62.1 kg (137 " lb).  ECOG PS 1  GA: AAOx3, NAD  HEENT: NCAT, PERRLA, EOMI, fair dentition, no oral ulcers  LYMPH: no cervical, axillary or supraclavicular adenopathy  CVS: s1s2 RRR, no M/R/G  RESP: CTA b/l but diminished bilaterally, no crackles, no wheezes or rhonchi. No labored breathing.   ABD: soft, NT, ND, BS+, no hepatosplenomegaly  EXT: no deformities, no pedal edema  SKIN: no rashes, no bruises or purpura, warm and dry  NEURO: normal mentation, strength 5/5 on all 4 extremities, no sensory deficits     Assessment/Plan:     1. Polycythemia D75.1   Etiology unclear but he is a smoker, has known COPD so chronic hypoxic state is likely. However some minimal increase in basophils noted which raises suspicion of MPN. JAK2 V617F detected but at very low levels, clinical significance uncertain  EPO level low normal.  Bone marrow biopsy was discussed in the past but he wanted to hold off at that point.  Sleep study was discussed he previously but he still wishes to hold off.  For secondary polycythemia no strict Hg/HCT goals but ideally target Hg <18.0 g/dl.  He is now only on Plavix.      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 VHL sequencing of entire gene was negative. EPOR exon 7 and 8 negative  This does not exclude familial polycythemia. We may need to test BPGM, HIF2/EPAS1, methemoglobin or we may need full sequencing of EPOR and JAK2 genes for some cryptic mutations      3. Tobacco use Z72.0   Lung cancer screening. CT chest non contrast 10/2020 with 2-3mm b/l pulm nodules. Repeat obtained 3/2022 which showed stability.   He could not tolerate either Chantix, nicotine lozenge or nicotine patches. He is now not interested in quitting at present.    Plan  Labs reviewed. Hgb/Hct stable. No phlebotomy needed at this time.  Patient continues to be on Plavix for primary prevention under PCP's care.    Reinforced smoking cessation.  He declines at this time but will continue smoking less.  RTC 4 months with CBC day prior.    The patient is in agreement with today's plan of care.  All questions answered.  Patient is aware to contact our office for any problems or concerns prior to next visit.                         This service was not originating from a related E/M service provided within the previous 7 days nor will  to an E/M service or procedure within the next 24 hours or my soonest available appointment.  Prevailing standard of care was able to be met in this audio-only visit.